# Patient Record
Sex: MALE | Race: BLACK OR AFRICAN AMERICAN | Employment: OTHER | URBAN - METROPOLITAN AREA
[De-identification: names, ages, dates, MRNs, and addresses within clinical notes are randomized per-mention and may not be internally consistent; named-entity substitution may affect disease eponyms.]

---

## 2019-10-15 LAB
CREATININE, EXTERNAL: 1.02
LDL-C, EXTERNAL: 110

## 2020-01-31 LAB — PSA, EXTERNAL: 5.8

## 2020-02-01 LAB — PSA, EXTERNAL: 5.8

## 2020-02-12 ENCOUNTER — OP HISTORICAL/CONVERTED ENCOUNTER (OUTPATIENT)
Dept: OTHER | Age: 65
End: 2020-02-12

## 2020-02-12 LAB
INR, EXTERNAL: 1.1
PT, EXTERNAL: 11

## 2020-03-13 ENCOUNTER — OP HISTORICAL/CONVERTED ENCOUNTER (OUTPATIENT)
Dept: OTHER | Age: 65
End: 2020-03-13

## 2020-06-24 VITALS
TEMPERATURE: 98.2 F | SYSTOLIC BLOOD PRESSURE: 111 MMHG | BODY MASS INDEX: 20.73 KG/M2 | HEIGHT: 69 IN | DIASTOLIC BLOOD PRESSURE: 66 MMHG | WEIGHT: 140 LBS

## 2020-06-24 RX ORDER — LEVOFLOXACIN 500 MG/1
TABLET, FILM COATED ORAL DAILY
COMMUNITY
End: 2020-12-16 | Stop reason: ALTCHOICE

## 2020-06-24 RX ORDER — TRAZODONE HYDROCHLORIDE 50 MG/1
50 TABLET ORAL
COMMUNITY
End: 2021-04-13 | Stop reason: SDUPTHER

## 2020-06-24 RX ORDER — LISINOPRIL 2.5 MG/1
2.5 TABLET ORAL DAILY
COMMUNITY

## 2020-06-24 RX ORDER — NITROGLYCERIN 0.4 MG/1
0.4 TABLET SUBLINGUAL
COMMUNITY
End: 2021-01-07

## 2020-06-24 RX ORDER — SIMVASTATIN 20 MG/1
TABLET, FILM COATED ORAL
COMMUNITY
End: 2020-12-16 | Stop reason: ALTCHOICE

## 2020-06-24 RX ORDER — SILDENAFIL 50 MG/1
100 TABLET, FILM COATED ORAL AS NEEDED
COMMUNITY
End: 2021-01-07

## 2020-06-24 RX ORDER — BISMUTH SUBSALICYLATE 262 MG
1 TABLET,CHEWABLE ORAL DAILY
COMMUNITY

## 2020-07-21 LAB — PSA, EXTERNAL: 6.8

## 2020-07-22 LAB — PSA SERPL-MCNC: 6.8 NG/ML (ref 0–4)

## 2020-07-29 VITALS
WEIGHT: 145 LBS | DIASTOLIC BLOOD PRESSURE: 66 MMHG | TEMPERATURE: 98.2 F | HEIGHT: 69 IN | SYSTOLIC BLOOD PRESSURE: 111 MMHG | BODY MASS INDEX: 21.48 KG/M2

## 2020-07-29 PROBLEM — R39.13 SPLITTING OF URINARY STREAM: Status: ACTIVE | Noted: 2020-07-29

## 2020-07-29 PROBLEM — N32.0 BLADDER NECK OBSTRUCTION: Status: ACTIVE | Noted: 2020-07-29

## 2020-07-29 PROBLEM — R35.0 FREQUENCY OF MICTURITION: Status: ACTIVE | Noted: 2020-07-29

## 2020-07-29 PROBLEM — C61 MALIGNANT NEOPLASM OF PROSTATE (HCC): Status: ACTIVE | Noted: 2020-07-29

## 2020-07-29 PROBLEM — R39.15 URGENCY OF URINATION: Status: ACTIVE | Noted: 2020-07-29

## 2020-07-29 PROBLEM — R82.79 URINE CULTURE POSITIVE: Status: ACTIVE | Noted: 2020-07-29

## 2020-07-29 PROBLEM — R31.29 MICROSCOPIC HEMATURIA: Status: ACTIVE | Noted: 2020-07-29

## 2020-07-30 ENCOUNTER — OFFICE VISIT (OUTPATIENT)
Dept: UROLOGY | Age: 65
End: 2020-07-30
Payer: MEDICARE

## 2020-07-30 VITALS
BODY MASS INDEX: 19.26 KG/M2 | SYSTOLIC BLOOD PRESSURE: 106 MMHG | TEMPERATURE: 97.9 F | HEIGHT: 69 IN | WEIGHT: 130 LBS | DIASTOLIC BLOOD PRESSURE: 62 MMHG

## 2020-07-30 DIAGNOSIS — R31.29 MICROSCOPIC HEMATURIA: ICD-10-CM

## 2020-07-30 DIAGNOSIS — R39.13 SPLITTING OF URINARY STREAM: ICD-10-CM

## 2020-07-30 DIAGNOSIS — R39.15 URGENCY OF URINATION: ICD-10-CM

## 2020-07-30 DIAGNOSIS — C61 MALIGNANT NEOPLASM OF PROSTATE (HCC): Primary | ICD-10-CM

## 2020-07-30 DIAGNOSIS — N32.0 BLADDER NECK OBSTRUCTION: ICD-10-CM

## 2020-07-30 DIAGNOSIS — R35.0 FREQUENCY OF MICTURITION: ICD-10-CM

## 2020-07-30 LAB
BILIRUB UR QL STRIP: NEGATIVE
GLUCOSE UR-MCNC: NEGATIVE MG/DL
KETONES P FAST UR STRIP-MCNC: NEGATIVE MG/DL
PH UR STRIP: 5.5 [PH] (ref 4.6–8)
PROT UR QL STRIP: NEGATIVE
SP GR UR STRIP: 1.02 (ref 1–1.03)
UA UROBILINOGEN AMB POC: NORMAL (ref 0.2–1)
URINALYSIS CLARITY POC: CLEAR
URINALYSIS COLOR POC: YELLOW
URINE BLOOD POC: NEGATIVE
URINE LEUKOCYTES POC: NEGATIVE
URINE NITRITES POC: NEGATIVE

## 2020-07-30 PROCEDURE — 99214 OFFICE O/P EST MOD 30 MIN: CPT | Performed by: UROLOGY

## 2020-07-30 PROCEDURE — G8420 CALC BMI NORM PARAMETERS: HCPCS | Performed by: UROLOGY

## 2020-07-30 PROCEDURE — 81003 URINALYSIS AUTO W/O SCOPE: CPT | Performed by: UROLOGY

## 2020-07-30 PROCEDURE — G8427 DOCREV CUR MEDS BY ELIG CLIN: HCPCS | Performed by: UROLOGY

## 2020-07-30 NOTE — PROGRESS NOTES
HPI ROS PE NOTE          History of Present Illness   Chief complaint carcinoma of the prostate, bladder neck obstruction, follow-up. Hector Anglin is a 76 y.o. male who presents with carcinoma of the prostate and bladder neck obstruction. Onset of symptoms was referral was made in January of this year due to elevated PSA of 6.4, 2019,  family history of prostate cancer and that his brother  of this disease. Patient underwent cystourethroscopy and prostate biopsy after repeat PSA was 5.8 on  with estimation of [place] 6 carcinoma was found. Metastatic evaluation with CT scan and bone scan was negative for spread. Mild symptoms of bladder neck obstruction are present, straining to urinate less than half the time, intermittency half the time and urinary frequency less than 1 time in 5. Patient had virtual visit follow-up 2020 at which time he was voiding satisfactorily. Continues to be followed for persistent microscopic hematuria as well as his prostate cancer. Stable course since that time. Underwent repeat PSA test 2020, = 6.8. Concern about possible higher risk focus of prostate cancer from biopsy missed by random samples.   Past Medical History:   Diagnosis Date    Hypercholesteremia     Hypertension     Malignant neoplasm of prostate ([facility] [address] 2020    Microscopic hematuria 2020      Past Surgical History:   Procedure Laterality Date    HX UROLOGICAL      cystourethroscopy BRP    HX WISDOM TEETH EXTRACTION      when he was little    [place] CYSTOURETHROSCOPY      [place] CYSTOURETHROSCOPY,URETER CATHETER       Family History   Problem Relation Age of Onset    Colon Cancer Brother     Heart Disease Mother     Hypertension Mother     Cancer Father       Social History     Tobacco Use    Smoking status: Current Every Day Smoker     Packs/day: 1.00     Years: 20.00     Pack years: 20.00    Smokeless tobacco: Never Used   Substance Use Topics    Alcohol use: Not Currently       Prior to Admission medications    Medication Sig Start Date End Date Taking? Authorizing Provider   lisinopriL (PRINIVIL, ZESTRIL) 2.5 mg tablet Take  by mouth daily. Yes Provider, Historical   nitroglycerin (NITROSTAT) 0.4 mg SL tablet 0.4 mg by SubLINGual route every five (5) minutes as needed for Chest Pain. Up to 3 doses. Yes Provider, Historical   sildenafil citrate (VIAGRA) 50 mg tablet Take 100 mg by mouth as needed for Erectile Dysfunction. Yes Provider, Historical   simvastatin (ZOCOR) 20 mg tablet Take  by mouth nightly. Yes Provider, Historical   traZODone (DESYREL) 50 mg tablet Take  by mouth nightly. Yes Provider, Historical   multivitamin (ONE A DAY) tablet Take 1 Tab by mouth daily. Yes Provider, Historical   levoFLOXacin (LEVAQUIN) 500 mg tablet Take  by mouth daily. Provider, Historical     Allergies   Allergen Reactions    Aspirin Unknown (comments)        Review of Systems:  Constitutional: negative  Respiratory: negative  Cardiovascular: negative  Gastrointestinal: negative  Genitourinary:positive for frequency and intermittency, straining to urinate  Musculoskeletal:negative  Neurological: negative     Physical Exam             Physical Exam:   General appearance: alert, cooperative, no distress, appears stated age  Head: Normocephalic, without obvious abnormality, atraumatic  Lungs: clear to auscultation bilaterally  Heart: regular rate and rhythm, S1, S2 normal, no murmur, click, rub or gallop  Abdomen: soft, non-tender.  Bowel sounds normal. No masses,  no organomegaly  Male genitalia: normal  Rectal: prostate enlarged 30 gm benign    Extremities: extremities normal, atraumatic, no cyanosis or edema  Neurologic: Grossly normal        Data Review:   Recent Results (from the past 48 hour(s))   AMB POC URINALYSIS DIP STICK AUTO W/O MICRO    Collection Time: 07/30/20 11:02 AM   Result Value Ref Range    Color (UA POC) Yellow     Clarity (UA POC) Clear Glucose (UA POC) Negative Negative    Bilirubin (UA POC) Negative Negative    Ketones (UA POC) Negative Negative    Specific gravity (UA POC) 1.020 1.001 - 1.035    Blood (UA POC) Negative Negative    pH (UA POC) 5.5 4.6 - 8.0    Protein (UA POC) Negative Negative    Urobilinogen (UA POC) 0.2 mg/dL 0.2 - 1    Nitrites (UA POC) Negative Negative    Leukocyte esterase (UA POC) Negative Negative     No results for input(s): 48 in the last 72 hours. Assessment and Plan:   Carcinoma of the prostate, elevated PSA test, significant  increase in PSA from 5.8,   to 6.8 , 2020; also has family history of prostate cancer with a brother who  of this disease; concern over possible higher risk focus of prostate cancer not diagnosed at the time of  Recent biopsy being  present; situation might call for a repeat biopsy, alternatively, MRI study of the prostate might identify a focus of higher risk disease. Earlier microscopic hematuria appears to have cleared at the time of this visit; bladder neck obstruction situation appears to be stable as well, no additional treatment is needed. Continue  vitamin E for possible Peyronie's disease. MRI of the prostate will be scheduled with a follow-up PSA test prior to the next visit in approximately 6 weeks. Bryant Fitzgerald M.D.  2020

## 2020-08-12 ENCOUNTER — OP HISTORICAL/CONVERTED ENCOUNTER (OUTPATIENT)
Dept: OTHER | Age: 65
End: 2020-08-12

## 2020-09-16 LAB — PSA SERPL-MCNC: 8 NG/ML (ref 0–4)

## 2020-09-22 ENCOUNTER — OFFICE VISIT (OUTPATIENT)
Dept: UROLOGY | Age: 65
End: 2020-09-22
Payer: MEDICARE

## 2020-09-22 VITALS
HEIGHT: 69 IN | SYSTOLIC BLOOD PRESSURE: 108 MMHG | DIASTOLIC BLOOD PRESSURE: 67 MMHG | TEMPERATURE: 98.5 F | WEIGHT: 130 LBS | BODY MASS INDEX: 19.26 KG/M2

## 2020-09-22 DIAGNOSIS — R39.13 SPLITTING OF URINARY STREAM: ICD-10-CM

## 2020-09-22 DIAGNOSIS — R31.29 MICROSCOPIC HEMATURIA: ICD-10-CM

## 2020-09-22 DIAGNOSIS — N32.0 BLADDER NECK OBSTRUCTION: ICD-10-CM

## 2020-09-22 DIAGNOSIS — C61 MALIGNANT NEOPLASM OF PROSTATE (HCC): Primary | ICD-10-CM

## 2020-09-22 DIAGNOSIS — R35.0 FREQUENCY OF MICTURITION: ICD-10-CM

## 2020-09-22 DIAGNOSIS — R39.15 URGENCY OF URINATION: ICD-10-CM

## 2020-09-22 PROCEDURE — G8427 DOCREV CUR MEDS BY ELIG CLIN: HCPCS | Performed by: UROLOGY

## 2020-09-22 PROCEDURE — 99214 OFFICE O/P EST MOD 30 MIN: CPT | Performed by: UROLOGY

## 2020-09-22 PROCEDURE — 81003 URINALYSIS AUTO W/O SCOPE: CPT | Performed by: UROLOGY

## 2020-09-22 NOTE — PROGRESS NOTES
HPI ROS PE NOTE          History of Present Illness   Chief complaint: Follow-up carcinoma of the prostate  Quang Orozco is a 76 y.o. male who presents with follow up with increasing PSA earlier [place] 6 carcinoma of the prostate notes January 31, 2020 PSA was 5.8 persistently elevated after an earlier value of 6.4. Patient had metastatic evaluation with CT scan and bone scan negative for spread repeat PSA test 7/29/2020 = 6.8; concern about a possible higher risk stratification focus led to performance of an MRI study. Study was done to be at [address] [place] [address] radiology but ended up being at [facility]. Reading of the MRI essentially negative for risk disease diagnosing prostatitis patient has mild symptoms of bladder neck obstruction ; International prostate symptom score of 2. Weak stream and intermittency on occasion, patient is delighted with his voiding pattern. Repeat PSA 9/15/2020 = 8.0, unfortunate additional increase. Microscopic hematuria earlier a problem appears to have resolved as of this visit . Past Medical History:   Diagnosis Date    Hypercholesteremia     Hypertension     Malignant neoplasm of prostate ([address] 7/29/2020    Microscopic hematuria 7/29/2020      Past Surgical History:   Procedure Laterality Date    HX UROLOGICAL      cystourethroscopy BRP    HX WISDOM TEETH EXTRACTION      when he was little    [place] CYSTOURETHROSCOPY      [place] CYSTOURETHROSCOPY,URETER CATHETER       Family History   Problem Relation Age of Onset    Colon Cancer Brother     Heart Disease Mother     Hypertension Mother     Cancer Father       Social History     Tobacco Use    Smoking status: Current Every Day Smoker     Packs/day: 1.00     Years: 20.00     Pack years: 20.00    Smokeless tobacco: Never Used   Substance Use Topics    Alcohol use: Not Currently       Prior to Admission medications    Medication Sig Start Date End Date Taking?  Authorizing Provider levoFLOXacin (LEVAQUIN) 500 mg tablet Take  by mouth daily. Yes Provider, Historical   lisinopriL (PRINIVIL, ZESTRIL) 2.5 mg tablet Take  by mouth daily. Yes Provider, Historical   nitroglycerin (NITROSTAT) 0.4 mg SL tablet 0.4 mg by SubLINGual route every five (5) minutes as needed for Chest Pain. Up to 3 doses. Yes Provider, Historical   sildenafil citrate (VIAGRA) 50 mg tablet Take 100 mg by mouth as needed for Erectile Dysfunction. Yes Provider, Historical   simvastatin (ZOCOR) 20 mg tablet Take  by mouth nightly. Yes Provider, Historical   traZODone (DESYREL) 50 mg tablet Take  by mouth nightly. Yes Provider, Historical   multivitamin (ONE A DAY) tablet Take 1 Tab by mouth daily. Yes Provider, Historical     Allergies   Allergen Reactions    Aspirin Hives        Review of Systems:  Constitutional: negative  Respiratory: negative  Cardiovascular: negative  Gastrointestinal: positive for constipation  Genitourinary:positive for decreased stream and Intermittency  Musculoskeletal:positive for arthralgias and stiff joints  Neurological: negative     Physical Exam     Physical Exam:   Visit Vitals  /67   Temp 98.5 °F (36.9 °C) (Temporal)   Ht 5' 9\" (1.753 m)   Wt 130 lb (59 kg)   BMI 19.20 kg/m²     General appearance: alert, cooperative, no distress, appears stated age  Head: Normocephalic, without obvious abnormality, atraumatic  Lungs: clear to auscultation bilaterally  Chest wall: no tenderness  Heart: regular rate and rhythm, S1, S2 normal, no murmur, click, rub or gallop  Abdomen: soft, non-tender.  Bowel sounds normal. No masses,  no organomegaly  Male genitalia: normal  Rectal: Prostate 30 gm benign   Extremities: extremities normal, atraumatic, no cyanosis or edema  Skin: Skin color, texture, turgor normal. No rashes or lesions    Data Review:   Recent Results (from the past 48 hour(s))   AMB POC URINALYSIS DIP STICK AUTO W/O MICRO    Collection Time: 09/22/20 10:45 AM   Result Value Ref Range    Color (UA POC) Yellow     Clarity (UA POC) Clear     Glucose (UA POC) Negative Negative    Bilirubin (UA POC) Negative Negative    Ketones (UA POC) Negative Negative    Specific gravity (UA POC) 1.020 1.001 - 1.035    Blood (UA POC) Negative Negative    pH (UA POC) 5.5 4.6 - 8.0    Protein (UA POC) Negative Negative    Urobilinogen (UA POC) 0.2 mg/dL 0.2 - 1    Nitrites (UA POC) Negative Negative    Leukocyte esterase (UA POC) Negative Negative     No results for input(s): 48 in the last 72 hours. Assessment and Plan:   Joby 6 carcinoma of the prostate, increasing PSA tests concerning for increased risk stratification; MRI study performed at [place], will attempt to obtain a review of this study by Dr. Barry Gonzalez of the radiology department at [facility], who is an expert in prostate MRI. Bladder neck obstruction with mild obstructive symptoms, will continue to follow with no active treatment at this time  Micro scopic hematuria previously investigated currently this is not present  Will repeat PSA test in 6 weeks and obtain follow-up visit, may consider repeat prostate biopsy if inconclusive report from Dr. Barry Gonzalez; this situation is a very difficult diagnostic problem, with increasing PSA tests strongly suggestive of higher risk prostate cancer present. Mr. Renetta Mejia has a reminder for a \"due or due soon\" health maintenance. I have asked that he contact his primary care provider for follow-up on this health maintenance. Daysi Richter M.D.  9/22/2020

## 2020-10-22 DIAGNOSIS — C61 MALIGNANT NEOPLASM OF PROSTATE (HCC): ICD-10-CM

## 2020-11-03 ENCOUNTER — OFFICE VISIT (OUTPATIENT)
Dept: UROLOGY | Age: 65
End: 2020-11-03
Payer: MEDICARE

## 2020-11-03 VITALS
DIASTOLIC BLOOD PRESSURE: 64 MMHG | TEMPERATURE: 97.1 F | BODY MASS INDEX: 19.26 KG/M2 | HEIGHT: 69 IN | WEIGHT: 130 LBS | SYSTOLIC BLOOD PRESSURE: 112 MMHG

## 2020-11-03 DIAGNOSIS — R35.0 FREQUENCY OF MICTURITION: ICD-10-CM

## 2020-11-03 DIAGNOSIS — N32.0 BLADDER NECK OBSTRUCTION: ICD-10-CM

## 2020-11-03 DIAGNOSIS — R39.15 URGENCY OF URINATION: ICD-10-CM

## 2020-11-03 DIAGNOSIS — R31.29 MICROSCOPIC HEMATURIA: ICD-10-CM

## 2020-11-03 DIAGNOSIS — C61 MALIGNANT NEOPLASM OF PROSTATE (HCC): Primary | ICD-10-CM

## 2020-11-03 DIAGNOSIS — R39.13 SPLITTING OF URINARY STREAM: ICD-10-CM

## 2020-11-03 LAB
BILIRUB UR QL STRIP: NEGATIVE
GLUCOSE UR-MCNC: NEGATIVE MG/DL
KETONES P FAST UR STRIP-MCNC: NEGATIVE MG/DL
PH UR STRIP: 6 [PH] (ref 4.6–8)
PROT UR QL STRIP: NEGATIVE
SP GR UR STRIP: 1.02 (ref 1–1.03)
UA UROBILINOGEN AMB POC: NORMAL (ref 0.2–1)
URINALYSIS CLARITY POC: CLEAR
URINALYSIS COLOR POC: YELLOW
URINE BLOOD POC: NEGATIVE
URINE LEUKOCYTES POC: NEGATIVE
URINE NITRITES POC: NEGATIVE

## 2020-11-03 PROCEDURE — 81003 URINALYSIS AUTO W/O SCOPE: CPT | Performed by: UROLOGY

## 2020-11-03 PROCEDURE — 99214 OFFICE O/P EST MOD 30 MIN: CPT | Performed by: UROLOGY

## 2020-11-03 PROCEDURE — G8427 DOCREV CUR MEDS BY ELIG CLIN: HCPCS | Performed by: UROLOGY

## 2020-11-03 NOTE — PROGRESS NOTES
HPI ROS PE NOTE          History of Present Illness   Chief complaint: Carcinoma of the prostate follow-up:  Ruiz Wright is a 76 y.o. male who presents with follow-up for increasing PSA test displayed an earlier [place] 6 carcinoma of the prostate diagnosed January 2020 at which time PSA was 5.8. Follow-up PSA in July 2020 was 6.8 concerned about possible increased risk stratification led to performing MRI of the prostate to check for higher grade lesions. I had intended to send the patient to [facility] Department of radiology with reading by Dr. Thiago Silveira, but study was done locally at [facility]. The reading of the MRI was negative for high risk lesions. However, the patient had a repeat PSA in September 2020 which was 8.0 further raising the concern of an undiagnosed lesion with higher risk stratification. The patient has only mild bladder outlet obstructive symptoms, international prostate symptom score of 2 consisting of nocturia twice per night with no other significant symptoms last visit I had intended to attempt to get Dr. Thiago Sivleira to review the locally produced MRI but this apparently had not been done as of this visit. The patient does not take any medicine for bladder outlet obstruction. He is noted to be on levofloxacin reason not clear from the record. .  Past Medical History:   Diagnosis Date    Hypercholesteremia     Hypertension     Malignant neoplasm of prostate ([address] 7/29/2020    Microscopic hematuria 7/29/2020      Past Surgical History:   Procedure Laterality Date    HX UROLOGICAL      cystourethroscopy BRP    HX WISDOM TEETH EXTRACTION      when he was little    [place] CYSTOURETHROSCOPY      [place] CYSTOURETHROSCOPY,URETER CATHETER       Family History   Problem Relation Age of Onset    Colon Cancer Brother     Heart Disease Mother     Hypertension Mother     Cancer Father       Social History     Tobacco Use    Smoking status: Current Every Day Smoker     Packs/day: 1.00     Years: 20.00     Pack years: 20.00    Smokeless tobacco: Never Used   Substance Use Topics    Alcohol use: Not Currently       Prior to Admission medications    Medication Sig Start Date End Date Taking? Authorizing Provider   levoFLOXacin (LEVAQUIN) 500 mg tablet Take  by mouth daily. Yes Provider, Historical   lisinopriL (PRINIVIL, ZESTRIL) 2.5 mg tablet Take  by mouth daily. Yes Provider, Historical   nitroglycerin (NITROSTAT) 0.4 mg SL tablet 0.4 mg by SubLINGual route every five (5) minutes as needed for Chest Pain. Up to 3 doses. Yes Provider, Historical   sildenafil citrate (VIAGRA) 50 mg tablet Take 100 mg by mouth as needed for Erectile Dysfunction. Yes Provider, Historical   simvastatin (ZOCOR) 20 mg tablet Take  by mouth nightly. Yes Provider, Historical   traZODone (DESYREL) 50 mg tablet Take  by mouth nightly. Yes Provider, Historical   multivitamin (ONE A DAY) tablet Take 1 Tab by mouth daily. Yes Provider, Historical     Allergies   Allergen Reactions    Aspirin Other (comments)     \"jittery\"        Review of Systems:  Constitutional: negative  Respiratory: negative  Cardiovascular: negative  Gastrointestinal: positive for constipation  Genitourinary:positive for Intermittency incomplete bladder emptying nocturia  Musculoskeletal:positive for arthralgias and stiff joints  Neurological: negative     Physical Exam     Physical Exam:   Visit Vitals  /64   Temp 97.1 °F (36.2 °C) (Temporal)   Ht 5' 9\" (1.753 m)   Wt 130 lb (59 kg)   BMI 19.20 kg/m²     General appearance: alert, cooperative, no distress, appears stated age  Head: Normocephalic, without obvious abnormality, atraumatic  Lungs: clear to auscultation bilaterally  Heart: regular rate and rhythm, S1, S2 normal, no murmur, click, rub or gallop  Abdomen: soft, non-tender.  Bowel sounds normal. No masses,  no organomegaly  Male genitalia: normal  Rectal: Prostate 30 g benign  Extremities: extremities normal, atraumatic, no cyanosis or edema  Skin: Skin color, texture, turgor normal. No rashes or lesions    Data Review:   Recent Results (from the past 48 hour(s))   AMB POC URINALYSIS DIP STICK AUTO W/O MICRO    Collection Time: 11/03/20 11:45 AM   Result Value Ref Range    Color (UA POC) Yellow     Clarity (UA POC) Clear     Glucose (UA POC) Negative Negative    Bilirubin (UA POC) Negative Negative    Ketones (UA POC) Negative Negative    Specific gravity (UA POC) 1.025 1.001 - 1.035    Blood (UA POC) Negative Negative    pH (UA POC) 6.0 4.6 - 8.0    Protein (UA POC) Negative Negative    Urobilinogen (UA POC) 0.2 mg/dL 0.2 - 1    Nitrites (UA POC) Negative Negative    Leukocyte esterase (UA POC) Negative Negative     No results for input(s): 48 in the last 72 hours. Assessment and Plan:   Carcinoma of the prostate earlier Joby 6 on biopsy, but increasing PSAs progressively raising concern for other occult lesions with higher risk stratification. Will repeat the PSA test today to check for progression in the last 2 months; I telephoned to Dr. Tad Snowden and left a message on his voicemail about asking him to review Mr. Ramone Alberts previous MRI study from [facility]. A follow-up appointment for 1 month is made and if all other matters are inconclusive and PSA is continuing to rise may perform a repeat MRI to be done at [facility] as earlier planned. Bladder neck obstruction, mild to moderate symptoms, currently no medications are prescribed. Extended consultation including telephone call to [facility] discussion of alternatives, 25 minutes of face-to-face counseling physical exam and record review plus telephone consultation. No problem-specific Assessment & Plan notes found for this encounter. Mr. Ramone Alberts has a reminder for a \"due or due soon\" health maintenance. I have asked that he contact his primary care provider for follow-up on this health maintenance. Miguel A Small M.D.  11/3/2020

## 2020-11-04 LAB
PSA FREE MFR SERPL: 34.4 %
PSA FREE SERPL-MCNC: 1.89 NG/ML
PSA SERPL-MCNC: 5.5 NG/ML (ref 0–4)
REFLEX CRITERIA: ABNORMAL

## 2020-11-05 ENCOUNTER — VIRTUAL VISIT (OUTPATIENT)
Dept: UROLOGY | Age: 65
End: 2020-11-05
Payer: MEDICARE

## 2020-11-05 DIAGNOSIS — R31.29 MICROSCOPIC HEMATURIA: ICD-10-CM

## 2020-11-05 DIAGNOSIS — R35.0 FREQUENCY OF MICTURITION: ICD-10-CM

## 2020-11-05 DIAGNOSIS — C61 MALIGNANT NEOPLASM OF PROSTATE (HCC): Primary | ICD-10-CM

## 2020-11-05 DIAGNOSIS — R39.13 SPLITTING OF URINARY STREAM: ICD-10-CM

## 2020-11-05 DIAGNOSIS — R39.15 URGENCY OF URINATION: ICD-10-CM

## 2020-11-05 DIAGNOSIS — N32.0 BLADDER NECK OBSTRUCTION: ICD-10-CM

## 2020-11-05 PROCEDURE — 99443 PR PHYS/QHP TELEPHONE EVALUATION 21-30 MIN: CPT | Performed by: UROLOGY

## 2020-11-05 NOTE — PROGRESS NOTES
HPI ROS PE NOTE          History of Present Illness   Chief complaint: Carcinoma of the prostate, increasing PSA, strong family history of prostate cancer; his visit is an extended telephone consultation with the patient and his family regarding his prostate cancer circumstances. Patient himself is of limited intellectual ability and requires some assistance from family members. Luke Griffin is a 76 y.o. male who presents with carcinoma of the prostate diagnosed in  2020 after PSA of 5.8 prompted prostate biopsy, result was [place] 6 in 1 specimen. This was thought to be low risk as expected, however PSA values have significantly increased months since that time. The patient was seen in the office November 3, 2020; her earlier visit patient was recommended to have an MRI of the prostate which was planned to be referred to [facility] Department of urology, to utilize the expertise of Dr. Román Moffett, Professor at [facility]. Due to a scheduling problem the patient had MRI done at [facility] which was read as normal with no evidence of significant foci of high risk prostate cancer. Since that visit I attempted to call Dr. Román Moffett to enlist his assistance in providing additional review of the MRI taken at the local hospital here. I was unsuccessful at getting in touch with him. The patient's sister concerned because of 2 other brothers who  from prostate cancer and thereby a strong family history of high risk prostate cancer. Patient had a repeat PSA done after his office visit 2 days ago. The value of this study was 5.5. The patient has mild bladder neck obstruction symptoms in addition to his elevated PSA International prostate symptom score of 2 reflecting occasional weak stream and intermittency .    Past Medical History:   Diagnosis Date    Hypercholesteremia     Hypertension     Malignant neoplasm of prostate ([address] 2020    Microscopic hematuria 2020      Past Surgical History: Procedure Laterality Date    HX UROLOGICAL      cystourethroscopy BRP    HX WISDOM TEETH EXTRACTION      when he was little    [place] CYSTOURETHROSCOPY      [place] CYSTOURETHROSCOPY,URETER CATHETER       Family History   Problem Relation Age of Onset    Colon Cancer Brother     Heart Disease Mother     Hypertension Mother     Cancer Father       Social History     Tobacco Use    Smoking status: Current Every Day Smoker     Packs/day: 1.00     Years: 20.00     Pack years: 20.00    Smokeless tobacco: Never Used   Substance Use Topics    Alcohol use: Not Currently       Prior to Admission medications    Medication Sig Start Date End Date Taking? Authorizing Provider   levoFLOXacin (LEVAQUIN) 500 mg tablet Take  by mouth daily. Yes Provider, Historical   lisinopriL (PRINIVIL, ZESTRIL) 2.5 mg tablet Take  by mouth daily. Yes Provider, Historical   nitroglycerin (NITROSTAT) 0.4 mg SL tablet 0.4 mg by SubLINGual route every five (5) minutes as needed for Chest Pain. Up to 3 doses. Yes Provider, Historical   sildenafil citrate (VIAGRA) 50 mg tablet Take 100 mg by mouth as needed for Erectile Dysfunction. Yes Provider, Historical   simvastatin (ZOCOR) 20 mg tablet Take  by mouth nightly. Yes Provider, Historical   traZODone (DESYREL) 50 mg tablet Take  by mouth nightly. Yes Provider, Historical   multivitamin (ONE A DAY) tablet Take 1 Tab by mouth daily. Yes Provider, Historical     Allergies   Allergen Reactions    Aspirin Other (comments)     \"jittery\"        Review of Systems:  Constitutional: negative  Cardiovascular: negative  Gastrointestinal: positive for constipation  Genitourinary:positive for decreased stream and Intermittency, Nocturia     Physical Exam     Physical Exam:   No exam performed today, patient had telephone consultation, virtual visit. Data Review:   No results found for this or any previous visit (from the past 48 hour(s)).   No results for input(s): 48 in the last 72 hours.      Assessment and Plan:   Carcinoma of the prostate concerning for increasing levels of PSA, original [place] 6 diagnosis with low risk with strong family history of 3 brothers dying of prostate cancer, PSA increased to 8.0 last month, extended discussion with the patient and his sister about the circumstances. Despite MRI being negative at a local hospital the patient's family wishes to engage the expertise of the [facility] Dr. Harris Lund with the most extended expertise and prostate MRIs and prostate cancer targeted biopsy diagnosis. An extended 25-minute consultation took place with review of all of the records and discussion of alternatives of care, counseling. We will seek to obtain appointment for prostate MRI from [facility] Department of radiology in the near future to resolve this issue. If MRI is negative I believe the patient can have a 6-month follow-up with PSA test done at that time. No problem-specific Assessment & Plan notes found for this encounter. Mr. Bret Piña has a reminder for a \"due or due soon\" health maintenance. I have asked that he contact his primary care provider for follow-up on this health maintenance. Silvestre Gonzalez M.D.  11/5/2020

## 2020-12-01 ENCOUNTER — TELEPHONE (OUTPATIENT)
Dept: UROLOGY | Age: 65
End: 2020-12-01

## 2020-12-01 NOTE — TELEPHONE ENCOUNTER
pts sister- Rena Rios contacted the office said she thought you were ordering a MRI of the prostate at [facility] and she hasn't heard anything. I do see that in your note but no order. Also she stated that the pt is concerned enough due to family history of prostate cancer that he just wants to go ahead and have his prostate removed. ... can you please call her and address this and what you would like to do next.

## 2020-12-15 PROBLEM — R55 SYNCOPE AND COLLAPSE: Status: ACTIVE | Noted: 2020-12-15

## 2020-12-16 ENCOUNTER — OFFICE VISIT (OUTPATIENT)
Dept: UROLOGY | Age: 65
End: 2020-12-16
Payer: MEDICARE

## 2020-12-16 VITALS
SYSTOLIC BLOOD PRESSURE: 130 MMHG | WEIGHT: 148 LBS | DIASTOLIC BLOOD PRESSURE: 60 MMHG | HEIGHT: 69 IN | TEMPERATURE: 97.8 F | BODY MASS INDEX: 21.92 KG/M2

## 2020-12-16 DIAGNOSIS — R39.15 URGENCY OF URINATION: ICD-10-CM

## 2020-12-16 DIAGNOSIS — C61 MALIGNANT NEOPLASM OF PROSTATE (HCC): Primary | ICD-10-CM

## 2020-12-16 DIAGNOSIS — R31.29 MICROSCOPIC HEMATURIA: ICD-10-CM

## 2020-12-16 DIAGNOSIS — R35.0 FREQUENCY OF MICTURITION: ICD-10-CM

## 2020-12-16 PROBLEM — N32.0 BLADDER NECK OBSTRUCTION: Status: RESOLVED | Noted: 2020-07-29 | Resolved: 2020-12-16

## 2020-12-16 PROBLEM — R39.13 SPLITTING OF URINARY STREAM: Status: RESOLVED | Noted: 2020-07-29 | Resolved: 2020-12-16

## 2020-12-16 LAB
BILIRUB UR QL STRIP: NEGATIVE
GLUCOSE UR-MCNC: NEGATIVE MG/DL
KETONES P FAST UR STRIP-MCNC: NEGATIVE MG/DL
PH UR STRIP: 5 [PH] (ref 4.6–8)
PROT UR QL STRIP: NEGATIVE
SP GR UR STRIP: 1.02 (ref 1–1.03)
UA UROBILINOGEN AMB POC: NORMAL (ref 0.2–1)
URINALYSIS CLARITY POC: CLEAR
URINALYSIS COLOR POC: YELLOW
URINE BLOOD POC: NEGATIVE
URINE LEUKOCYTES POC: NEGATIVE
URINE NITRITES POC: NEGATIVE

## 2020-12-16 PROCEDURE — 81003 URINALYSIS AUTO W/O SCOPE: CPT | Performed by: UROLOGY

## 2020-12-16 PROCEDURE — 99215 OFFICE O/P EST HI 40 MIN: CPT | Performed by: UROLOGY

## 2020-12-16 NOTE — ASSESSMENT & PLAN NOTE
Joby 6 prostate cancer diagnosed 2/12/2020. Has been on active surveillance. PSA was 6.4 October 2019. PSA 11/3/2020 was 5.5 with 34% free. His PSA was 8.0 on 9/15/2020. Options include active surveillance, surgery, radiation, androgen deprivation, or other uncommon treatments. We specifically discussed robotic prostatectomy with pelvic lymph node dissection. The risks include standard risks of anesthesia and surgery including bleeding, clots, injury, infection, death or other. The specific risks include incontinence, impotence, swelling or edema. The patient had the opportunity to ask questions.   He wishes to proceed to a robotic prostatectomy with pelvic lymph node dissection

## 2020-12-16 NOTE — PROGRESS NOTES
HISTORY OF PRESENT ILLNESS  Tae Sherwood is a 76 y.o. male. Chief Complaint   Patient presents with    Follow-up    Prostate Cancer    Urinary Frequency    Microscopic Hematuria     He is a 59-year-old man diagnosed with prostate cancer by Dr. Fatou Robertson in 2020. He had Placentia 6 cancer and is on active surveillance. His PSA has been fluctuating. See the problem list.    He is interested in surgery. He is here with his brother-in-law. His sister Rena Rios is on the phone as well. Occasionally he has a slow stream but is not bothered. Chronic Conditions Addressed Today     1. Malignant neoplasm of prostate [facility]) - Primary     Overview      He had a prostate biopsy by Dr. Xiang Craven on 2/12/2020. PSA October 2019 was 6.4. There was a single focus of Placentia's grade 3+3 at the left apex. CT on 3/13/2020 revealed splenomegaly and signs of portal venous hypertension. There is no evidence of metastatic disease. Bone scan 3/13/2020 revealed no metastatic disease. The patient had an MRI of the pelvis on 8/21/2020. There were no concerning findings. Current Assessment & Plan      Joby 6 prostate cancer diagnosed 2/12/2020. Has been on active surveillance. PSA was 6.4 October 2019. PSA 11/3/2020 was 5.5 with 34% free. His PSA was 8.0 on 9/15/2020. Options include active surveillance, surgery, radiation, androgen deprivation, or other uncommon treatments. We specifically discussed robotic prostatectomy with pelvic lymph node dissection. The risks include standard risks of anesthesia and surgery including bleeding, clots, injury, infection, death or other. The specific risks include incontinence, impotence, swelling or edema. The patient had the opportunity to ask questions. He wishes to proceed to a robotic prostatectomy with pelvic lymph node dissection         2. Microscopic hematuria     Current Assessment & Plan      He has no gross hematuria.          3. Frequency of micturition     Current Assessment & Plan      He is not bothered by his urinary pattern         4. RESOLVED: Urgency of urination            Review of Systems   All other systems reviewed and are negative. Past Medical History:   Diagnosis Date    Hypercholesteremia     Hypertension     Malignant neoplasm of prostate ([address] 7/29/2020    Microscopic hematuria 7/29/2020      Past Surgical History:   Procedure Laterality Date    HX UROLOGICAL      cystourethroscopy BRP    HX WISDOM TEETH EXTRACTION      when he was little    OH CYSTOURETHROSCOPY      OH CYSTOURETHROSCOPY,URETER CATHETER       Family History   Problem Relation Age of Onset    Colon Cancer Brother     Cancer Brother     Heart Disease Mother     Hypertension Mother     Cancer Father     Cancer Paternal Grandfather         Physical Exam  Constitutional:       General: He is not in acute distress. Appearance: Normal appearance. HENT:      Head: Normocephalic and atraumatic. Eyes:      Extraocular Movements: Extraocular movements intact. Pupils: Pupils are equal, round, and reactive to light. Cardiovascular:      Rate and Rhythm: Normal rate and regular rhythm. Pulmonary:      Effort: Pulmonary effort is normal. No respiratory distress. Breath sounds: Normal breath sounds. No wheezing or rhonchi. Abdominal:      General: Abdomen is flat. There is no distension. Palpations: Abdomen is soft. There is no mass. Tenderness: There is no abdominal tenderness. There is no right CVA tenderness, left CVA tenderness, guarding or rebound. Hernia: No hernia is present. Genitourinary:     Penis: Normal. No phimosis, hypospadias or tenderness. Testes: Normal.         Right: Mass, tenderness or swelling not present. Left: Mass, tenderness or swelling not present. Epididymis:      Right: Normal. No mass or tenderness. Left: Normal. No mass or tenderness. Prostate: Enlarged (2+.).  Not tender and no nodules present. Rectum: Normal.   Musculoskeletal: Normal range of motion. Lymphadenopathy:      Cervical: No cervical adenopathy. Upper Body:      Right upper body: No supraclavicular adenopathy. Left upper body: No supraclavicular adenopathy. Skin:     General: Skin is warm and dry. Neurological:      General: No focal deficit present. Mental Status: He is alert and oriented to person, place, and time. Psychiatric:         Mood and Affect: Mood normal.         Behavior: Behavior normal.                     ASSESSMENT and PLAN  Diagnoses and all orders for this visit:    1. Malignant neoplasm of prostate [facility])  Assessment & Plan:  Joby 6 prostate cancer diagnosed 2/12/2020. Has been on active surveillance. PSA was 6.4 October 2019. PSA 11/3/2020 was 5.5 with 34% free. His PSA was 8.0 on 9/15/2020. Options include active surveillance, surgery, radiation, androgen deprivation, or other uncommon treatments. We specifically discussed robotic prostatectomy with pelvic lymph node dissection. The risks include standard risks of anesthesia and surgery including bleeding, clots, injury, infection, death or other. The specific risks include incontinence, impotence, swelling or edema. The patient had the opportunity to ask questions. He wishes to proceed to a robotic prostatectomy with pelvic lymph node dissection      2. Urgency of urination  -     AMB POC URINALYSIS DIP STICK AUTO W/O MICRO    3. Frequency of micturition  Assessment & Plan:  He is not bothered by his urinary pattern      4. Microscopic hematuria  Assessment & Plan:  He has no gross hematuria. I spent over 40 minutes with the patient and records. Greater than 50% of the time was in counseling. The patient and family had the opportunity ask questions and felt comfortable proceeding as planned.         Marcia Mayorga MD

## 2020-12-16 NOTE — LETTER
12/16/2020 Patient: Margarita Arnold YOB: 1955 Date of Visit: 12/16/2020 Gatito Sher DO 
[address] [place] [id number] Via Fax: [phone] Dear Gatito Sher DO, Thank you for referring Mr. Shahida Bright to [place] [address] for evaluation. My notes for this consultation are attached. If you have questions, please do not hesitate to call me. I look forward to following your patient along with you.  
 
 
Sincerely, 
 
Marcella Kong MD

## 2021-01-07 ENCOUNTER — HOSPITAL ENCOUNTER (OUTPATIENT)
Dept: PREADMISSION TESTING | Age: 66
Discharge: HOME | End: 2021-01-07
Payer: MEDICARE

## 2021-01-07 ENCOUNTER — HOSPITAL ENCOUNTER (OUTPATIENT)
Dept: GENERAL RADIOLOGY | Age: 66
Discharge: HOME | End: 2021-01-07
Attending: UROLOGY
Payer: MEDICARE

## 2021-01-07 VITALS
HEIGHT: 70 IN | HEART RATE: 54 BPM | DIASTOLIC BLOOD PRESSURE: 70 MMHG | WEIGHT: 147 LBS | SYSTOLIC BLOOD PRESSURE: 131 MMHG | OXYGEN SATURATION: 98 % | RESPIRATION RATE: 16 BRPM | TEMPERATURE: 97.7 F | BODY MASS INDEX: 21.05 KG/M2

## 2021-01-07 LAB
ABO + RH BLD: NORMAL
ANION GAP SERPL CALC-SCNC: 3 MMOL/L (ref 5–15)
APPEARANCE UR: CLEAR
ATRIAL RATE: 50 BPM
BACTERIA URNS QL MICRO: NEGATIVE /HPF
BILIRUB UR QL: NEGATIVE
BLOOD GROUP ANTIBODIES SERPL: NEGATIVE
BUN SERPL-MCNC: 19 MG/DL (ref 6–20)
BUN/CREAT SERPL: 17 (ref 12–20)
CA-I BLD-MCNC: 9.2 MG/DL (ref 8.5–10.1)
CALCULATED P AXIS, ECG09: 74 DEGREES
CALCULATED R AXIS, ECG10: 68 DEGREES
CALCULATED T AXIS, ECG11: 74 DEGREES
CHLORIDE SERPL-SCNC: 111 MMOL/L (ref 97–108)
CO2 SERPL-SCNC: 29 MMOL/L (ref 21–32)
COLOR UR: ABNORMAL
CREAT SERPL-MCNC: 1.13 MG/DL (ref 0.7–1.3)
DIAGNOSIS, 93000: NORMAL
ERYTHROCYTE [DISTWIDTH] IN BLOOD BY AUTOMATED COUNT: 15.2 % (ref 11.5–14.5)
GLUCOSE SERPL-MCNC: 95 MG/DL (ref 65–100)
GLUCOSE UR STRIP.AUTO-MCNC: NEGATIVE MG/DL
HCT VFR BLD AUTO: 36.2 % (ref 36.6–50.3)
HGB BLD-MCNC: 12.5 G/DL (ref 12.1–17)
HGB UR QL STRIP: NEGATIVE
KETONES UR QL STRIP.AUTO: NEGATIVE MG/DL
LEUKOCYTE ESTERASE UR QL STRIP.AUTO: NEGATIVE
MCH RBC QN AUTO: 27.4 PG (ref 26–34)
MCHC RBC AUTO-ENTMCNC: 34.5 G/DL (ref 30–36.5)
MCV RBC AUTO: 79.4 FL (ref 80–99)
MUCOUS THREADS URNS QL MICRO: ABNORMAL /LPF
NITRITE UR QL STRIP.AUTO: NEGATIVE
P-R INTERVAL, ECG05: 174 MS
PH UR STRIP: 5 [PH] (ref 5–8)
PLATELET # BLD AUTO: 87 K/UL (ref 150–400)
PMV BLD AUTO: 8.2 FL (ref 8.9–12.9)
POTASSIUM SERPL-SCNC: 4.2 MMOL/L (ref 3.5–5.1)
PROT UR STRIP-MCNC: NEGATIVE MG/DL
Q-T INTERVAL, ECG07: 466 MS
QRS DURATION, ECG06: 78 MS
QTC CALCULATION (BEZET), ECG08: 424 MS
RBC # BLD AUTO: 4.56 M/UL (ref 4.1–5.7)
RBC #/AREA URNS HPF: ABNORMAL /HPF (ref 0–5)
SARS-COV-2, COV2: NORMAL
SODIUM SERPL-SCNC: 143 MMOL/L (ref 136–145)
SP GR UR REFRACTOMETRY: 1.02 (ref 1–1.03)
SPECIMEN EXP DATE BLD: NORMAL
UROBILINOGEN UR QL STRIP.AUTO: 2 EU/DL (ref 0.1–1)
VENTRICULAR RATE, ECG03: 50 BPM
WBC # BLD AUTO: 5.9 K/UL (ref 4.1–11.1)
WBC URNS QL MICRO: ABNORMAL /HPF (ref 0–4)

## 2021-01-07 PROCEDURE — 81003 URINALYSIS AUTO W/O SCOPE: CPT

## 2021-01-07 PROCEDURE — 80048 BASIC METABOLIC PNL TOTAL CA: CPT

## 2021-01-07 PROCEDURE — 93005 ELECTROCARDIOGRAM TRACING: CPT

## 2021-01-07 PROCEDURE — 71046 X-RAY EXAM CHEST 2 VIEWS: CPT

## 2021-01-07 PROCEDURE — 85027 COMPLETE CBC AUTOMATED: CPT

## 2021-01-07 PROCEDURE — 87086 URINE CULTURE/COLONY COUNT: CPT

## 2021-01-07 PROCEDURE — 87635 SARS-COV-2 COVID-19 AMP PRB: CPT

## 2021-01-07 PROCEDURE — 86901 BLOOD TYPING SEROLOGIC RH(D): CPT

## 2021-01-07 PROCEDURE — 36415 COLL VENOUS BLD VENIPUNCTURE: CPT

## 2021-01-07 RX ORDER — ERGOCALCIFEROL 1.25 MG/1
50000 CAPSULE ORAL
COMMUNITY

## 2021-01-07 RX ORDER — SIMVASTATIN 40 MG/1
40 TABLET, FILM COATED ORAL
COMMUNITY

## 2021-01-07 NOTE — PROGRESS NOTES
Carolina Milligan NP is aware of platelet count of 87 and urobilinogen of 2. No new orders at this time.

## 2021-01-08 LAB — SARS-COV-2, COV2NT: NOT DETECTED

## 2021-01-09 LAB
BACTERIA SPEC CULT: NORMAL
SPECIAL REQUESTS,SREQ: NORMAL

## 2021-01-11 ENCOUNTER — ANESTHESIA EVENT (OUTPATIENT)
Dept: SURGERY | Age: 66
End: 2021-01-11
Payer: MEDICARE

## 2021-01-11 ENCOUNTER — ANESTHESIA (OUTPATIENT)
Dept: SURGERY | Age: 66
End: 2021-01-11
Payer: MEDICARE

## 2021-01-11 ENCOUNTER — HOSPITAL ENCOUNTER (OUTPATIENT)
Age: 66
Discharge: HOME | End: 2021-01-12
Attending: UROLOGY | Admitting: UROLOGY
Payer: MEDICARE

## 2021-01-11 DIAGNOSIS — C61 PROSTATE CANCER (HCC): Primary | ICD-10-CM

## 2021-01-11 LAB
APPEARANCE UR: CLEAR
BACTERIA URNS QL MICRO: NEGATIVE /HPF
BILIRUB UR QL: NEGATIVE
COLOR UR: ABNORMAL
GLUCOSE UR STRIP.AUTO-MCNC: NEGATIVE MG/DL
HGB UR QL STRIP: NEGATIVE
KETONES UR QL STRIP.AUTO: NEGATIVE MG/DL
LEUKOCYTE ESTERASE UR QL STRIP.AUTO: NEGATIVE
MRSA DNA SPEC QL NAA+PROBE: NOT DETECTED
MUCOUS THREADS URNS QL MICRO: ABNORMAL /LPF
NITRITE UR QL STRIP.AUTO: NEGATIVE
PH UR STRIP: 5 [PH] (ref 5–8)
PROT UR STRIP-MCNC: NEGATIVE MG/DL
RBC #/AREA URNS HPF: ABNORMAL /HPF (ref 0–5)
SP GR UR REFRACTOMETRY: 1.02 (ref 1–1.03)
UROBILINOGEN UR QL STRIP.AUTO: 2 EU/DL (ref 0.1–1)
WBC URNS QL MICRO: ABNORMAL /HPF (ref 0–4)

## 2021-01-11 PROCEDURE — 77030035277 HC OBTRTR BLDELSS DISP INTU -B: Performed by: UROLOGY

## 2021-01-11 PROCEDURE — 74011000250 HC RX REV CODE- 250: Performed by: NURSE ANESTHETIST, CERTIFIED REGISTERED

## 2021-01-11 PROCEDURE — 77030003578 HC NDL INSUF VERES AMR -B: Performed by: UROLOGY

## 2021-01-11 PROCEDURE — 77030018813 HC SCIS LAPSCP EPIX DISP AMR -B: Performed by: UROLOGY

## 2021-01-11 PROCEDURE — 77030002896 HC SUT CLP ABSRB J&J -B: Performed by: UROLOGY

## 2021-01-11 PROCEDURE — 77030019908 HC STETH ESOPH SIMS -A: Performed by: ANESTHESIOLOGY

## 2021-01-11 PROCEDURE — 88309 TISSUE EXAM BY PATHOLOGIST: CPT

## 2021-01-11 PROCEDURE — 77030041703 HC SLV COMPR DVT ARJO -B: Performed by: UROLOGY

## 2021-01-11 PROCEDURE — 74011250637 HC RX REV CODE- 250/637: Performed by: NURSE PRACTITIONER

## 2021-01-11 PROCEDURE — 77030034696 HC CATH URETH FOL 2W BARD -A: Performed by: UROLOGY

## 2021-01-11 PROCEDURE — 74011250636 HC RX REV CODE- 250/636: Performed by: NURSE ANESTHETIST, CERTIFIED REGISTERED

## 2021-01-11 PROCEDURE — 76010000877 HC OR TIME 2.5 TO 3HR INTENSV - TIER 2: Performed by: UROLOGY

## 2021-01-11 PROCEDURE — 77030013079 HC BLNKT BAIR HGGR 3M -A: Performed by: ANESTHESIOLOGY

## 2021-01-11 PROCEDURE — 88344 IMHCHEM/IMCYTCHM EA MLT ANTB: CPT

## 2021-01-11 PROCEDURE — 77030002933 HC SUT MCRYL J&J -A: Performed by: UROLOGY

## 2021-01-11 PROCEDURE — 77030016151 HC PROTCTR LNS DFOG COVD -B: Performed by: UROLOGY

## 2021-01-11 PROCEDURE — 77030005515 HC CATH URETH FOL14 BARD -B: Performed by: UROLOGY

## 2021-01-11 PROCEDURE — 88305 TISSUE EXAM BY PATHOLOGIST: CPT

## 2021-01-11 PROCEDURE — 77030009851 HC PCH RTVR ENDOSC AMR -B: Performed by: UROLOGY

## 2021-01-11 PROCEDURE — 77030008518 HC TBNG INSUF ENDO STRY -B: Performed by: UROLOGY

## 2021-01-11 PROCEDURE — 77030002966 HC SUT PDS J&J -A: Performed by: UROLOGY

## 2021-01-11 PROCEDURE — 77030008606 HC TRCR ENDOSC KII AMR -B: Performed by: UROLOGY

## 2021-01-11 PROCEDURE — 76060000036 HC ANESTHESIA 2.5 TO 3 HR: Performed by: UROLOGY

## 2021-01-11 PROCEDURE — 81001 URINALYSIS AUTO W/SCOPE: CPT

## 2021-01-11 PROCEDURE — 77030008684 HC TU ET CUF COVD -B: Performed by: ANESTHESIOLOGY

## 2021-01-11 PROCEDURE — 74011250636 HC RX REV CODE- 250/636: Performed by: UROLOGY

## 2021-01-11 PROCEDURE — 74011000258 HC RX REV CODE- 258: Performed by: UROLOGY

## 2021-01-11 PROCEDURE — 76210000016 HC OR PH I REC 1 TO 1.5 HR: Performed by: UROLOGY

## 2021-01-11 PROCEDURE — 55866 LAPS SURG PRST8ECT RPBIC RAD: CPT | Performed by: UROLOGY

## 2021-01-11 PROCEDURE — 2709999900 HC NON-CHARGEABLE SUPPLY: Performed by: UROLOGY

## 2021-01-11 PROCEDURE — 77030010935 HC CLP LIG ABSRB TELE -B: Performed by: UROLOGY

## 2021-01-11 PROCEDURE — 77030008756 HC TU IRR SUC STRY -B: Performed by: UROLOGY

## 2021-01-11 PROCEDURE — 74011000250 HC RX REV CODE- 250: Performed by: UROLOGY

## 2021-01-11 PROCEDURE — 38770 REMOVE PELVIS LYMPH NODES: CPT | Performed by: UROLOGY

## 2021-01-11 PROCEDURE — 77030008771 HC TU NG SALEM SUMP -A: Performed by: ANESTHESIOLOGY

## 2021-01-11 PROCEDURE — 74011250637 HC RX REV CODE- 250/637: Performed by: UROLOGY

## 2021-01-11 PROCEDURE — 87641 MR-STAPH DNA AMP PROBE: CPT

## 2021-01-11 PROCEDURE — 77030026438 HC STYL ET INTUB CARD -A: Performed by: ANESTHESIOLOGY

## 2021-01-11 PROCEDURE — 88307 TISSUE EXAM BY PATHOLOGIST: CPT

## 2021-01-11 PROCEDURE — 77030031139 HC SUT VCRL2 J&J -A: Performed by: UROLOGY

## 2021-01-11 PROCEDURE — 77030022704 HC SUT VLOC COVD -B: Performed by: UROLOGY

## 2021-01-11 PROCEDURE — 77030010507 HC ADH SKN DERMBND J&J -B: Performed by: UROLOGY

## 2021-01-11 PROCEDURE — 77030020703 HC SEAL CANN DISP INTU -B: Performed by: UROLOGY

## 2021-01-11 PROCEDURE — 87086 URINE CULTURE/COLONY COUNT: CPT

## 2021-01-11 PROCEDURE — 77030002986 HC SUT PROL J&J -A: Performed by: UROLOGY

## 2021-01-11 DEVICE — CLIP LIG ABSRB HEM-LOK LG PUR --: Type: IMPLANTABLE DEVICE | Status: FUNCTIONAL

## 2021-01-11 RX ORDER — ATROPA BELLADONNA AND OPIUM 16.2; 6 MG/1; MG/1
1 SUPPOSITORY RECTAL
Status: DISCONTINUED | OUTPATIENT
Start: 2021-01-11 | End: 2021-01-11 | Stop reason: SDUPTHER

## 2021-01-11 RX ORDER — FACIAL-BODY WIPES
10 EACH TOPICAL DAILY
Status: DISCONTINUED | OUTPATIENT
Start: 2021-01-12 | End: 2021-01-12 | Stop reason: HOSPADM

## 2021-01-11 RX ORDER — SODIUM CHLORIDE, SODIUM LACTATE, POTASSIUM CHLORIDE, CALCIUM CHLORIDE 600; 310; 30; 20 MG/100ML; MG/100ML; MG/100ML; MG/100ML
INJECTION, SOLUTION INTRAVENOUS
Status: DISCONTINUED | OUTPATIENT
Start: 2021-01-11 | End: 2021-01-11 | Stop reason: HOSPADM

## 2021-01-11 RX ORDER — SODIUM CHLORIDE, SODIUM LACTATE, POTASSIUM CHLORIDE, CALCIUM CHLORIDE 600; 310; 30; 20 MG/100ML; MG/100ML; MG/100ML; MG/100ML
25 INJECTION, SOLUTION INTRAVENOUS CONTINUOUS
Status: DISCONTINUED | OUTPATIENT
Start: 2021-01-11 | End: 2021-01-11 | Stop reason: HOSPADM

## 2021-01-11 RX ORDER — DOCUSATE SODIUM 100 MG/1
100 CAPSULE, LIQUID FILLED ORAL 2 TIMES DAILY
Status: DISPENSED | OUTPATIENT
Start: 2021-01-11 | End: 2021-01-12

## 2021-01-11 RX ORDER — HYDROMORPHONE HYDROCHLORIDE 2 MG/ML
INJECTION, SOLUTION INTRAMUSCULAR; INTRAVENOUS; SUBCUTANEOUS AS NEEDED
Status: DISCONTINUED | OUTPATIENT
Start: 2021-01-11 | End: 2021-01-11 | Stop reason: HOSPADM

## 2021-01-11 RX ORDER — SODIUM CHLORIDE 9 MG/ML
INJECTION, SOLUTION INTRAVENOUS
Status: DISCONTINUED | OUTPATIENT
Start: 2021-01-11 | End: 2021-01-11 | Stop reason: HOSPADM

## 2021-01-11 RX ORDER — ZOLPIDEM TARTRATE 5 MG/1
5 TABLET ORAL
Status: DISCONTINUED | OUTPATIENT
Start: 2021-01-11 | End: 2021-01-12 | Stop reason: HOSPADM

## 2021-01-11 RX ORDER — ONDANSETRON 2 MG/ML
INJECTION INTRAMUSCULAR; INTRAVENOUS AS NEEDED
Status: DISCONTINUED | OUTPATIENT
Start: 2021-01-11 | End: 2021-01-11 | Stop reason: HOSPADM

## 2021-01-11 RX ORDER — FAMOTIDINE 10 MG/ML
INJECTION INTRAVENOUS AS NEEDED
Status: DISCONTINUED | OUTPATIENT
Start: 2021-01-11 | End: 2021-01-11 | Stop reason: HOSPADM

## 2021-01-11 RX ORDER — MORPHINE SULFATE 2 MG/ML
2 INJECTION, SOLUTION INTRAMUSCULAR; INTRAVENOUS
Status: DISCONTINUED | OUTPATIENT
Start: 2021-01-11 | End: 2021-01-12 | Stop reason: HOSPADM

## 2021-01-11 RX ORDER — ROCURONIUM BROMIDE 10 MG/ML
INJECTION, SOLUTION INTRAVENOUS AS NEEDED
Status: DISCONTINUED | OUTPATIENT
Start: 2021-01-11 | End: 2021-01-11 | Stop reason: HOSPADM

## 2021-01-11 RX ORDER — EPHEDRINE SULFATE/0.9% NACL/PF 50 MG/5 ML
SYRINGE (ML) INTRAVENOUS AS NEEDED
Status: DISCONTINUED | OUTPATIENT
Start: 2021-01-11 | End: 2021-01-11 | Stop reason: HOSPADM

## 2021-01-11 RX ORDER — ATROPA BELLADONNA AND OPIUM 16.2; 6 MG/1; MG/1
1 SUPPOSITORY RECTAL
Status: DISCONTINUED | OUTPATIENT
Start: 2021-01-11 | End: 2021-01-12 | Stop reason: HOSPADM

## 2021-01-11 RX ORDER — ACETAMINOPHEN 325 MG/1
650 TABLET ORAL
Status: DISCONTINUED | OUTPATIENT
Start: 2021-01-11 | End: 2021-01-12 | Stop reason: HOSPADM

## 2021-01-11 RX ORDER — DEXTROSE, SODIUM CHLORIDE, AND POTASSIUM CHLORIDE 5; .45; .15 G/100ML; G/100ML; G/100ML
100 INJECTION INTRAVENOUS CONTINUOUS
Status: DISCONTINUED | OUTPATIENT
Start: 2021-01-11 | End: 2021-01-12 | Stop reason: HOSPADM

## 2021-01-11 RX ORDER — NORETHINDRONE AND ETHINYL ESTRADIOL 0.5-0.035
KIT ORAL
Status: DISPENSED
Start: 2021-01-11 | End: 2021-01-11

## 2021-01-11 RX ORDER — SODIUM CHLORIDE 0.9 % (FLUSH) 0.9 %
5-40 SYRINGE (ML) INJECTION AS NEEDED
Status: DISCONTINUED | OUTPATIENT
Start: 2021-01-11 | End: 2021-01-12 | Stop reason: HOSPADM

## 2021-01-11 RX ORDER — FENTANYL CITRATE 50 UG/ML
INJECTION, SOLUTION INTRAMUSCULAR; INTRAVENOUS AS NEEDED
Status: DISCONTINUED | OUTPATIENT
Start: 2021-01-11 | End: 2021-01-11 | Stop reason: HOSPADM

## 2021-01-11 RX ORDER — LIDOCAINE HYDROCHLORIDE 20 MG/ML
INJECTION, SOLUTION EPIDURAL; INFILTRATION; INTRACAUDAL; PERINEURAL AS NEEDED
Status: DISCONTINUED | OUTPATIENT
Start: 2021-01-11 | End: 2021-01-11 | Stop reason: HOSPADM

## 2021-01-11 RX ORDER — NEOSTIGMINE METHYLSULFATE 1 MG/ML
INJECTION INTRAVENOUS AS NEEDED
Status: DISCONTINUED | OUTPATIENT
Start: 2021-01-11 | End: 2021-01-11 | Stop reason: HOSPADM

## 2021-01-11 RX ORDER — DEXAMETHASONE SODIUM PHOSPHATE 4 MG/ML
INJECTION, SOLUTION INTRA-ARTICULAR; INTRALESIONAL; INTRAMUSCULAR; INTRAVENOUS; SOFT TISSUE AS NEEDED
Status: DISCONTINUED | OUTPATIENT
Start: 2021-01-11 | End: 2021-01-11 | Stop reason: HOSPADM

## 2021-01-11 RX ORDER — HYDROCODONE BITARTRATE AND ACETAMINOPHEN 5; 325 MG/1; MG/1
1 TABLET ORAL
Status: DISCONTINUED | OUTPATIENT
Start: 2021-01-11 | End: 2021-01-12 | Stop reason: HOSPADM

## 2021-01-11 RX ORDER — TRAZODONE HYDROCHLORIDE 50 MG/1
50 TABLET ORAL
Status: DISCONTINUED | OUTPATIENT
Start: 2021-01-11 | End: 2021-01-12 | Stop reason: HOSPADM

## 2021-01-11 RX ORDER — LISINOPRIL 5 MG/1
2.5 TABLET ORAL DAILY
Status: DISCONTINUED | OUTPATIENT
Start: 2021-01-12 | End: 2021-01-12 | Stop reason: HOSPADM

## 2021-01-11 RX ORDER — BUPIVACAINE HYDROCHLORIDE 2.5 MG/ML
INJECTION, SOLUTION EPIDURAL; INFILTRATION; INTRACAUDAL AS NEEDED
Status: DISCONTINUED | OUTPATIENT
Start: 2021-01-11 | End: 2021-01-11 | Stop reason: HOSPADM

## 2021-01-11 RX ORDER — MIDAZOLAM HYDROCHLORIDE 1 MG/ML
INJECTION, SOLUTION INTRAMUSCULAR; INTRAVENOUS AS NEEDED
Status: DISCONTINUED | OUTPATIENT
Start: 2021-01-11 | End: 2021-01-11 | Stop reason: HOSPADM

## 2021-01-11 RX ORDER — PROPOFOL 10 MG/ML
INJECTION, EMULSION INTRAVENOUS AS NEEDED
Status: DISCONTINUED | OUTPATIENT
Start: 2021-01-11 | End: 2021-01-11 | Stop reason: HOSPADM

## 2021-01-11 RX ORDER — SIMETHICONE 80 MG
80 TABLET,CHEWABLE ORAL
Status: DISCONTINUED | OUTPATIENT
Start: 2021-01-11 | End: 2021-01-12 | Stop reason: HOSPADM

## 2021-01-11 RX ORDER — ATORVASTATIN CALCIUM 20 MG/1
20 TABLET, FILM COATED ORAL
Status: DISCONTINUED | OUTPATIENT
Start: 2021-01-11 | End: 2021-01-12 | Stop reason: HOSPADM

## 2021-01-11 RX ORDER — ONDANSETRON 2 MG/ML
4 INJECTION INTRAMUSCULAR; INTRAVENOUS
Status: DISCONTINUED | OUTPATIENT
Start: 2021-01-11 | End: 2021-01-12 | Stop reason: HOSPADM

## 2021-01-11 RX ORDER — SODIUM CHLORIDE 0.9 % (FLUSH) 0.9 %
5-40 SYRINGE (ML) INJECTION EVERY 8 HOURS
Status: DISCONTINUED | OUTPATIENT
Start: 2021-01-11 | End: 2021-01-12 | Stop reason: HOSPADM

## 2021-01-11 RX ORDER — DIPHENHYDRAMINE HYDROCHLORIDE 50 MG/ML
12.5 INJECTION, SOLUTION INTRAMUSCULAR; INTRAVENOUS
Status: DISCONTINUED | OUTPATIENT
Start: 2021-01-11 | End: 2021-01-12 | Stop reason: HOSPADM

## 2021-01-11 RX ORDER — GLYCOPYRROLATE 0.2 MG/ML
INJECTION INTRAMUSCULAR; INTRAVENOUS AS NEEDED
Status: DISCONTINUED | OUTPATIENT
Start: 2021-01-11 | End: 2021-01-11 | Stop reason: HOSPADM

## 2021-01-11 RX ORDER — GENTAMICIN SULFATE 40 MG/ML
80 INJECTION, SOLUTION INTRAMUSCULAR; INTRAVENOUS ONCE
Status: DISCONTINUED | OUTPATIENT
Start: 2021-01-11 | End: 2021-01-11 | Stop reason: DRUGHIGH

## 2021-01-11 RX ADMIN — SIMETHICONE 80 MG: 80 TABLET, CHEWABLE ORAL at 17:13

## 2021-01-11 RX ADMIN — HYDROCODONE BITARTRATE AND ACETAMINOPHEN 1 TABLET: 5; 325 TABLET ORAL at 12:11

## 2021-01-11 RX ADMIN — ATORVASTATIN CALCIUM 20 MG: 20 TABLET, FILM COATED ORAL at 21:23

## 2021-01-11 RX ADMIN — Medication 12.5 MG: at 11:00

## 2021-01-11 RX ADMIN — MIDAZOLAM HYDROCHLORIDE 2 MG: 2 INJECTION, SOLUTION INTRAMUSCULAR; INTRAVENOUS at 08:06

## 2021-01-11 RX ADMIN — ONDANSETRON 4 MG: 2 INJECTION INTRAMUSCULAR; INTRAVENOUS at 10:28

## 2021-01-11 RX ADMIN — FENTANYL CITRATE 100 MCG: 50 INJECTION, SOLUTION INTRAMUSCULAR; INTRAVENOUS at 08:07

## 2021-01-11 RX ADMIN — GLYCOPYRROLATE 0.2 MG: 0.2 INJECTION, SOLUTION INTRAMUSCULAR; INTRAVENOUS at 08:29

## 2021-01-11 RX ADMIN — PHENYLEPHRINE HYDROCHLORIDE 200 MCG: 10 INJECTION INTRAVENOUS at 10:21

## 2021-01-11 RX ADMIN — Medication 10 ML: at 21:35

## 2021-01-11 RX ADMIN — MORPHINE SULFATE 2 MG: 2 INJECTION, SOLUTION INTRAMUSCULAR; INTRAVENOUS at 11:41

## 2021-01-11 RX ADMIN — DOCUSATE SODIUM 100 MG: 100 CAPSULE, LIQUID FILLED ORAL at 21:23

## 2021-01-11 RX ADMIN — NEOSTIGMINE METHYLSULFATE 4 MG: 1 INJECTION INTRAVENOUS at 10:34

## 2021-01-11 RX ADMIN — ROCURONIUM BROMIDE 10 MG: 10 SOLUTION INTRAVENOUS at 09:04

## 2021-01-11 RX ADMIN — GLYCOPYRROLATE 0.6 MG: 0.2 INJECTION, SOLUTION INTRAMUSCULAR; INTRAVENOUS at 10:34

## 2021-01-11 RX ADMIN — POTASSIUM CHLORIDE, DEXTROSE MONOHYDRATE AND SODIUM CHLORIDE 100 ML/HR: 150; 5; 450 INJECTION, SOLUTION INTRAVENOUS at 13:14

## 2021-01-11 RX ADMIN — GENTAMICIN SULFATE 333.6 MG: 40 INJECTION, SOLUTION INTRAMUSCULAR; INTRAVENOUS at 08:16

## 2021-01-11 RX ADMIN — PROPOFOL 200 MG: 10 INJECTION, EMULSION INTRAVENOUS at 08:11

## 2021-01-11 RX ADMIN — SODIUM CHLORIDE, POTASSIUM CHLORIDE, SODIUM LACTATE AND CALCIUM CHLORIDE: 600; 310; 30; 20 INJECTION, SOLUTION INTRAVENOUS at 08:00

## 2021-01-11 RX ADMIN — CEFAZOLIN SODIUM 2 G: 1 INJECTION, POWDER, FOR SOLUTION INTRAMUSCULAR; INTRAVENOUS at 23:41

## 2021-01-11 RX ADMIN — Medication 12.5 MG: at 11:08

## 2021-01-11 RX ADMIN — PHENYLEPHRINE HYDROCHLORIDE 200 MCG: 10 INJECTION INTRAVENOUS at 09:51

## 2021-01-11 RX ADMIN — MIDAZOLAM HYDROCHLORIDE 2 MG: 2 INJECTION, SOLUTION INTRAMUSCULAR; INTRAVENOUS at 08:02

## 2021-01-11 RX ADMIN — FAMOTIDINE 20 MG: 10 INJECTION INTRAVENOUS at 09:18

## 2021-01-11 RX ADMIN — LIDOCAINE HYDROCHLORIDE 20 MG: 20 INJECTION, SOLUTION EPIDURAL; INFILTRATION; INTRACAUDAL; PERINEURAL at 08:11

## 2021-01-11 RX ADMIN — SODIUM CHLORIDE: 9 INJECTION, SOLUTION INTRAVENOUS at 07:20

## 2021-01-11 RX ADMIN — ROCURONIUM BROMIDE 60 MG: 10 SOLUTION INTRAVENOUS at 08:11

## 2021-01-11 RX ADMIN — CEFAZOLIN SODIUM 2 G: 1 INJECTION, POWDER, FOR SOLUTION INTRAMUSCULAR; INTRAVENOUS at 08:22

## 2021-01-11 RX ADMIN — SODIUM CHLORIDE 1000 ML: 9 INJECTION, SOLUTION INTRAVENOUS at 06:47

## 2021-01-11 RX ADMIN — PHENYLEPHRINE HYDROCHLORIDE 200 MCG: 10 INJECTION INTRAVENOUS at 09:03

## 2021-01-11 RX ADMIN — TRAZODONE HYDROCHLORIDE 50 MG: 50 TABLET ORAL at 21:24

## 2021-01-11 RX ADMIN — CEFAZOLIN SODIUM 2 G: 1 INJECTION, POWDER, FOR SOLUTION INTRAMUSCULAR; INTRAVENOUS at 17:14

## 2021-01-11 RX ADMIN — HYDROMORPHONE HYDROCHLORIDE 1 MG: 2 INJECTION INTRAMUSCULAR; INTRAVENOUS; SUBCUTANEOUS at 08:37

## 2021-01-11 RX ADMIN — DEXAMETHASONE SODIUM PHOSPHATE 8 MG: 4 INJECTION, SOLUTION INTRA-ARTICULAR; INTRALESIONAL; INTRAMUSCULAR; INTRAVENOUS; SOFT TISSUE at 09:18

## 2021-01-11 NOTE — ANESTHESIA PREPROCEDURE EVALUATION
Relevant Problems   PERSONAL HX & FAMILY HX OF CANCER   (+) Malignant neoplasm of prostate (HCC)   (+) Prostate cancer ([facility] [address]       Anesthetic History   No history of anesthetic complications            Review of Systems / Medical History  Patient summary reviewed, nursing notes reviewed and pertinent labs reviewed    Pulmonary          Smoker         Neuro/Psych   Within defined limits           Cardiovascular    Hypertension: well controlled          Hyperlipidemia    Exercise tolerance: >4 METS     GI/Hepatic/Renal  Within defined limits              Endo/Other        Cancer     Other Findings   Comments: Smoker 0.5 ppd  Prostate CA            Physical Exam    Airway  Mallampati: II  TM Distance: 4 - 6 cm  Neck ROM: normal range of motion   Mouth opening: Normal     Cardiovascular    Rhythm: regular  Rate: normal         Dental    Dentition: Poor dentition     Pulmonary  Breath sounds clear to auscultation               Abdominal  GI exam deferred       Other Findings   Comments: Multiple missing teeth     Results for Deborrah Sacks (MRN [id number]) as of 1/11/2021 06:35   Ref. Range 1/7/2021 10:30   WBC Latest Ref Range: 4.1 - 11.1 K/uL 5.9   RBC Latest Ref Range: 4.10 - 5.70 M/uL 4.56   HGB Latest Ref Range: 12.1 - 17.0 g/dL 12.5   HCT Latest Ref Range: 36.6 - 50.3 % 36.2 (L)   MCV Latest Ref Range: 80.0 - 99.0 FL 79.4 (L)   MCH Latest Ref Range: 26.0 - 34.0 PG 27.4   MCHC Latest Ref Range: 30.0 - 36.5 g/dL 34.5   RDW Latest Ref Range: 11.5 - 14.5 % 15.2 (H)   PLATELET Latest Ref Range: 150 - 400 K/uL 87 (L)   MPV Latest Ref Range: 8.9 - 12.9 FL 8.2 (L)   Results for Deborrah Sacks (MRN [id number]) as of 1/11/2021 06:35   Ref.  Range 1/7/2021 10:30 1/7/2021 10:35   Sodium Latest Ref Range: 136 - 145 mmol/L 143    Potassium Latest Ref Range: 3.5 - 5.1 mmol/L 4.2    Chloride Latest Ref Range: 97 - 108 mmol/L 111 (H)    CO2 Latest Ref Range: 21 - 32 mmol/L 29    Anion gap Latest Ref Range: 5 - 15 mmol/L 3 (L) Glucose Latest Ref Range: 65 - 100 mg/dL 95    BUN Latest Ref Range: 6 - 20 mg/dL 19    Creatinine Latest Ref Range: 0.70 - 1.30 mg/dL 1.13    BUN/Creatinine ratio Latest Ref Range: 12 - 20   17    Calcium Latest Ref Range: 8.5 - 10.1 mg/dL 9.2    GFR est non-AA Latest Ref Range: >60 ml/min/1.73m2 >60    GFR est AA Latest Ref Range: >60 ml/min/1.73m2 >60      Results for Tj Winkler (MRN [id number]) as of 1/11/2021 06:35   Ref.  Range 1/7/2021 10:30 1/7/2021 10:35   Crossmatch Expiration Unknown 01/28/2021,7800    TYPE & SCREEN Unknown Rpt        Sinus bradycardia   Moderate voltage criteria for LVH, may be normal variant   Borderline ECG   When compared with ECG of 11-AUG-2010 10:50,   No significant change was found   Confirmed by Lawanda Piña MD, Parisa Jimenez ([id number]) on 1/7/2021 1:07:22 PM     Anesthetic Plan    ASA: 2  Anesthesia type: general          Induction: Intravenous  Anesthetic plan and risks discussed with: Patient

## 2021-01-11 NOTE — ROUTINE PROCESS
Pt. Transferred to room 441 via bed in stable condition. Bedside report given to Roz Schwab, [facility]. Pt. Restless, states always restless, and can't sit still, wants to get up and walk. Informed receiving RN of pain med.s given. Pt. States no family in hospital and do not need to call anyone to update.
No

## 2021-01-11 NOTE — ANESTHESIA POSTPROCEDURE EVALUATION
Procedure(s):  ROBOTIC ASSISTED LAPAROSCOPIC PROSTATECTOMY WITH BILATERAL PELVIC LYMPH NODE DISSECTION.     general    Anesthesia Post Evaluation      Multimodal analgesia: multimodal analgesia not used between 6 hours prior to anesthesia start to PACU discharge  Patient location during evaluation: PACU  Patient participation: complete - patient participated  Level of consciousness: agitated and awake and alert  Pain score: 2  Pain management: adequate  Airway patency: patent  Anesthetic complications: no  Cardiovascular status: acceptable and hemodynamically stable  Respiratory status: acceptable, room air and spontaneous ventilation  Hydration status: stable  Comments: C/o stomach pain, pain medication given, no N/V, pt restless and agitated, sat 100% on RA   Post anesthesia nausea and vomiting:  none  Final Post Anesthesia Temperature Assessment:  Normothermia (36.0-37.5 degrees C)      INITIAL Post-op Vital signs:   Vitals Value Taken Time   /59 01/11/21 1130   Temp 36.2 °C (97.1 °F) 01/11/21 1111   Pulse 72 01/11/21 1130   Resp 20 01/11/21 1130   SpO2 96 % 01/11/21 1130

## 2021-01-11 NOTE — PROGRESS NOTES
Cont.  No further med.s ordered at this time. Pt. Restless. States unit can call if needs further orders.

## 2021-01-11 NOTE — PROGRESS NOTES
Four eyes skin assessment completed with Billy Ceballos RN. Scab on L shin, 2 small raw spots on R cheek, surgical incisions x 6.

## 2021-01-11 NOTE — OP NOTES
UROLOGY OPERATIVE NOTE    Patient: Patricia Gill MRN: [id number]  SSN: [id number]    YOB: 1955  Age: 76 y.o. Sex: male          Pre-operative Diagnosis: Prostate CA ([address] [C61]  Post-operative Diagnosis: Prostate CA ([address] [C61]  Procedure: Robotic prostatectomy  Limited pelvic lymph node dissection    Surgeon: Afia Corcoran MD    Anesthesia:  General  Findings: Scarred periprostatic planes   Estimated Blood Loss:       100  Drains: 16F moss catheter  Specimens: left and right pelvic lymph nodes, prostate with seminal vesicles  Implants: * No implants in log *  Complications: none           Procedure Details: The patient was seen in the pre-operative area. The risks, benefits, complications, alternative treatment options, and expected outcomes were again discussed with the patient. The possibilities of reaction to medication, pain, infection, bleeding, major cardiovascular event, death, damage to surrounding structures were specifically addressed. Informed consent was then obtained. Upon arrival to the operative suite, the patient, procedure, and side were confirmed via a pre-operative \"time-out\". All were in agreement. The patient was carefully positioned and anesthesia was undertaken. Sterile prep and drape was accomplished. The patient was placed in a low lithotomy position in trendelenberg. A 18F moss catheter was placed. An 8mm subumbilical incision was made and a Veress needle was inserted into the peritoneum. CO2 insufflation was started to 15mmHg pressure. An 8mm daVinci trocar was placed. Laparoscopy was performed. There were left sigmoid adhesions. 8mm daVinci trocars were placed in the paramedian line and the right abdomen. A 12mm trocar was placed in the left abdomen and a 5mm trocar in the left upper quadrant. The Soundflavori XI surgical robot was docked to the patient. The sigmoid adhesions were taken down.     The Pouch of Abby Viramontes was inspected and the peritoneum was opened over the vas deferens and seminal vesicles. The vas deferens were dissected out and clipped and incised bilaterally. The seminal vesicles were dissected out. The anterior peritoneum was incised in an inverted U shaped incision. The Space of Retzius was dissected to the pubic arches. The left obturator lymph nodes were dissected from the external iliac vein anteriorly, distally to the femoral canal, proximally to the vas deferens and bifurcation, and posteriorly to the obturator vessels and nerves. A similar dissection was performed on the opposite side. The endopelvic fascia was opened on either side of the prostate. The levator muscle was dissected away from the prostate. The puboprostatic ligaments were incised. The dorsal vein complex was suture ligated with 0 Vicryl suture x2. A backbleeding stitch was placed at the bladder neck. The plane between the prostate and bladder was dissected to the catheter. This was withdrawn upward. The lateral and posterior bladder neck dissections was continued. Posteriorly the dissection was joined to the prior seminal vesicle dissection. Denonvillier's fascia was opened and the pararectal space was dissected. The superior pedicles were clipped and incised. The lateral prostatic fascia was released. The posterolateral dissection was taken down to the apex of the prostate with clips and bipolar cautery. Anteriorly the dorsal vein complex was incised. The urethral was incised and the remaining apical attachments were incised. The prostate was entrapped in a specimen bag for later retrieval.      The posterior bladder neck was sewn to the rectourethralis with 0 Vicryl. The tails were left long. Doubled 3-0 V-marty suture was used to create the urethrovesical anastomosis. A new 16F moss catheter was easily placed. The posterior support suture was stitched to the pubic symphysis.    Irrigation of the moss with 180cc revealed no leakage. The robot was undocked from the patient. The specimen bag string was withdrawn through the subumbilical incision and the remaining ports were removed under direct vision. The subumbilical incision was extended slightly to allow extraction of the specimen. Specimen was passed off for pathologic submission. The transversalis fascia was anesthetized with 0.25% Marcaine. A fascia was closed with 0 PDS suture. The skin incisions were closed with 4-0 Monocryl subcuticular sutures and Dermabond skin glue. At the conclusion of the case, all needle counts, instrument counts, and sponge counts were correct. The patient was transported in stable condition to recovery.      Bere Bowen MD

## 2021-01-12 VITALS
HEIGHT: 70 IN | TEMPERATURE: 98.9 F | RESPIRATION RATE: 16 BRPM | SYSTOLIC BLOOD PRESSURE: 130 MMHG | HEART RATE: 64 BPM | DIASTOLIC BLOOD PRESSURE: 67 MMHG | OXYGEN SATURATION: 100 % | WEIGHT: 147 LBS | BODY MASS INDEX: 21.05 KG/M2

## 2021-01-12 LAB
ANION GAP SERPL CALC-SCNC: 2 MMOL/L (ref 5–15)
BUN SERPL-MCNC: 13 MG/DL (ref 6–20)
BUN/CREAT SERPL: 10 (ref 12–20)
CA-I BLD-MCNC: 8.4 MG/DL (ref 8.5–10.1)
CHLORIDE SERPL-SCNC: 112 MMOL/L (ref 97–108)
CO2 SERPL-SCNC: 27 MMOL/L (ref 21–32)
CREAT SERPL-MCNC: 1.32 MG/DL (ref 0.7–1.3)
ERYTHROCYTE [DISTWIDTH] IN BLOOD BY AUTOMATED COUNT: 15.5 % (ref 11.5–14.5)
GLUCOSE SERPL-MCNC: 129 MG/DL (ref 65–100)
HCT VFR BLD AUTO: 30.9 % (ref 36.6–50.3)
HGB BLD-MCNC: 10.5 G/DL (ref 12.1–17)
MCH RBC QN AUTO: 27.1 PG (ref 26–34)
MCHC RBC AUTO-ENTMCNC: 34 G/DL (ref 30–36.5)
MCV RBC AUTO: 79.8 FL (ref 80–99)
PLATELET # BLD AUTO: 77 K/UL (ref 150–400)
PMV BLD AUTO: 8.4 FL (ref 8.9–12.9)
POTASSIUM SERPL-SCNC: 3.9 MMOL/L (ref 3.5–5.1)
RBC # BLD AUTO: 3.87 M/UL (ref 4.1–5.7)
SODIUM SERPL-SCNC: 141 MMOL/L (ref 136–145)
WBC # BLD AUTO: 6.8 K/UL (ref 4.1–11.1)

## 2021-01-12 PROCEDURE — 99024 POSTOP FOLLOW-UP VISIT: CPT | Performed by: NURSE PRACTITIONER

## 2021-01-12 PROCEDURE — 85027 COMPLETE CBC AUTOMATED: CPT

## 2021-01-12 PROCEDURE — 74011250637 HC RX REV CODE- 250/637: Performed by: UROLOGY

## 2021-01-12 PROCEDURE — 74011250636 HC RX REV CODE- 250/636: Performed by: UROLOGY

## 2021-01-12 PROCEDURE — 74011250636 HC RX REV CODE- 250/636: Performed by: NURSE PRACTITIONER

## 2021-01-12 PROCEDURE — 36415 COLL VENOUS BLD VENIPUNCTURE: CPT

## 2021-01-12 PROCEDURE — 80048 BASIC METABOLIC PNL TOTAL CA: CPT

## 2021-01-12 PROCEDURE — 74011000250 HC RX REV CODE- 250: Performed by: UROLOGY

## 2021-01-12 RX ORDER — HYDROCODONE BITARTRATE AND ACETAMINOPHEN 5; 325 MG/1; MG/1
1 TABLET ORAL
Qty: 10 TAB | Refills: 0 | Status: SHIPPED | OUTPATIENT
Start: 2021-01-12 | End: 2021-01-15

## 2021-01-12 RX ORDER — DOCUSATE SODIUM 100 MG/1
100 CAPSULE, LIQUID FILLED ORAL 2 TIMES DAILY
Qty: 20 CAP | Refills: 0 | Status: SHIPPED | OUTPATIENT
Start: 2021-01-12 | End: 2021-01-22

## 2021-01-12 RX ORDER — CEPHALEXIN 500 MG/1
500 CAPSULE ORAL 3 TIMES DAILY
Qty: 36 CAP | Refills: 0 | Status: SHIPPED | OUTPATIENT
Start: 2021-01-12 | End: 2021-01-24

## 2021-01-12 RX ORDER — FACIAL-BODY WIPES
10 EACH TOPICAL DAILY
Qty: 10 SUPPOSITORY | Refills: 1 | Status: SHIPPED | OUTPATIENT
Start: 2021-01-12 | End: 2021-01-22

## 2021-01-12 RX ADMIN — ACETAMINOPHEN 650 MG: 325 TABLET, FILM COATED ORAL at 13:39

## 2021-01-12 RX ADMIN — POTASSIUM CHLORIDE, DEXTROSE MONOHYDRATE AND SODIUM CHLORIDE 100 ML/HR: 150; 5; 450 INJECTION, SOLUTION INTRAVENOUS at 02:35

## 2021-01-12 RX ADMIN — HYDROCODONE BITARTRATE AND ACETAMINOPHEN 1 TABLET: 5; 325 TABLET ORAL at 05:10

## 2021-01-12 RX ADMIN — CEFAZOLIN SODIUM 2 G: 1 INJECTION, POWDER, FOR SOLUTION INTRAMUSCULAR; INTRAVENOUS at 08:32

## 2021-01-12 NOTE — PROGRESS NOTES
Fresh ice water and snack to pt-applesauce. Pt called nurse back into room, points to lower middle tooth, ''loose'', pt says it ''wiggles'' and painful when attempts to eat, pt says was not like that prior to surgery and feels may have been loosened during airway insertion during surgery. Will notify anesthesia.

## 2021-01-12 NOTE — PROGRESS NOTES
Pt ambulated around unit with nurse walking with him, tolerated well, pt then sat in recliner. Pt now back in bed, no complaints.

## 2021-01-12 NOTE — PROGRESS NOTES
Harley De La Garza, RN, nursing supervisor notified of pt c/o tooth  Injury, she instructed to notify JOSE ANTONIO heredia. David Call, notified of pt complaint, name, room number given.

## 2021-01-12 NOTE — DISCHARGE INSTRUCTIONS
LEAVING THE HOSPITAL AFTER YOUR PROSTATECTOMY  DISCHARGE INSTRUCTIONS FOR DR. PIERCE'S PATIENTS    Your Reilly Catheter   You will be discharged from the hospital with a urinary catheter in place. It is held inside of the bladder by a balloon. It allows for continuous drainage of urine into an external drainage bag. This must stay in place while you heal.   It will be secured with a STATLOCK. This is a small sticker applied to your thigh with a plastic clip. It helps to keep the catheter in place so that it will not pull on you. This will be removed in 1 week when your catheter is removed.  Do not try to remove your catheter. It must stay in place until your follow up appointment while you heal.   Your nurses will teach you how to empty the drainage bag before you go home. Activity   Do not drive while you have your Reilly catheter (~1 week).  Refrain from vigorous activity (running, golfing, exercising, horseback riding, bicycling) for 4-6 weeks after your surgery. Walking is fine.  Avoid sitting still in one position for prolonged periods of time (more than 45 minutes). Bathing   Do not soak in tubs, or swim, or submerge yourself in water.  You may shower as soon as you get home from the hospital.  Do not scrub the glue on your incision sites. Work   When you may return to work is variable. It will depend on your occupation and how quickly you recover. Specific questions can be addressed at your 1 week follow up. Medication   Most patients experience only minimal discomfort. Dr. Rodriguez prefers you treat this with Tylenol (avoid ibuprofen or aspirin). Usually, Tylenol is enough to treat the pain.  You will be sent home with a stronger, prescription pain reliever. However, it is important to note that these medications tend to be EXTREMELY constipating. It is better to avoid them, and only take them if you are having significant pain.      You will also have 10 days of an antibiotic. You will take your antibiotic from the time you get home until about 3 days after your first follow up visit.  You can resume most of your home medications as soon as you leave the hospital except for anti-coagulants like Coumadin, aspirin, or Eloquis. Some diabetic medications are also not ok to resume. If you have questions about your regular medications, please call the office.  At the time of discharge, you will also have a prescription for the stool softener and the suppository you received during your hospital stay. You may take these daily until you have a bowel movement. You may continue taking the stool softener as needed to combat constipation. Food   We recommend you stick to a bland, soft diet immediately after you are discharged from the hospital.     Do not force yourself to eat. It can contribute to the abdominal bloating you may feel. Once you have had a bowel movement, you can start eating normally, as you feel comfortable.  Avoid gas producing foods (beans, flour, broccoli) that can make you more uncomfortable.  Spread out eating throughout the day with snacks and small meals. Avoid eating large meals at first.    [facility] AFTER SURGERY   Abdominal distension, constipation, or bloating. Make sure you are taking your stool softener as directed and drinking plenty of water. Avoid carbonated beverages and gas-producing foods. You can use a suppository daily. The prescription pain medicine can contribute to this- so only take it when you are having significant pain. Walking and moving around help to move the gas out of your belly.  Bladder spasm This is typically associated with the sudden onset of lower abdominal discomfort, a strong urge to urinate, or sudden leakage around your catheter. If this persists, call the office.    Catheter Leakage  Many times if your catheter is kinked or obstructed by a kink in the tubing, you can leak at the insertion site. Please make sure to regularly check for kinks or bends in the tubing.  Blood in the urine. You may have blood in your urine off and on for several weeks after a urologic procedure. The blood can make your urine look tea-colored or pink.   This is normal.      WHEN TO CALL THE DOCTOR:   You have a fever over 100.5F   You have nausea or vomiting for more than 24 hours   It becomes hard to breathe   You cannot urinate        [facility] [address], [address] [place] [address]

## 2021-01-12 NOTE — PROGRESS NOTES
Patient given discharge instructions and verbalizes understanding. Instructed patient on catheter care and gave instructions. IV's removed from left and right arms with tip intact. Patient remains stable at this time. Patient discharged via wheelchair with friend to drive him home.

## 2021-01-12 NOTE — DISCHARGE SUMMARY
UROLOGY Progress Note         ROSS Willis, FNP-C  Urology, [facility]  Supervising Physician: Dr. Christina Luz MD  [phone]        PROVIDER DISCHARGE SUMMARY     Patient ID:    Leonardo Jones  [id number]  52 y.o.  1955    Admit date: 1/11/2021    Discharge date : 1/12/2021    Diagnoses: Prostate CA [facility] [place] Sherri  Prostate cancer [facility] [place]    Reason for Hospitalization: surgery, or treatment for above mentioned diagnosis      Procedures: Robotic prostatectomy, Limited pelvic lymph node dissection on 1/11/21. Hospital Course: Uncomplicated hospital course following surgery. Pain is well-controlled. Patient is tolerating a diet. Patient is ambulatory. Labs are reviewed and felt to be satisfactory for discharge. Incision sites are clean, dry and intact. Follow up Care: Your appointment with us is on: 1/20/21 at 2 pm    Discharge Medications:   Current Discharge Medication List      START taking these medications    Details   HYDROcodone-acetaminophen (NORCO) 5-325 mg per tablet Take 1 Tab by mouth every six (6) hours as needed for Pain for up to 3 days. Max Daily Amount: 4 Tabs. Qty: 10 Tab, Refills: 0    Associated Diagnoses: Prostate cancer ([address]      docusate sodium (COLACE) 100 mg capsule Take 1 Cap by mouth two (2) times a day for 10 days. Qty: 20 Cap, Refills: 0      bisacodyL (DULCOLAX) 10 mg supp Insert 10 mg into rectum daily for 10 days. Qty: 10 Suppository, Refills: 1      cephALEXin (KEFLEX) 500 mg capsule Take 1 Cap by mouth three (3) times daily for 12 days. Qty: 36 Cap, Refills: 0         CONTINUE these medications which have NOT CHANGED    Details   ergocalciferol (Vitamin D2) 1,250 mcg (50,000 unit) capsule Take 50,000 Units by mouth every seven (7) days. multivitamin (ONE A DAY) tablet Take 1 Tab by mouth daily. simvastatin (ZOCOR) 40 mg tablet Take 40 mg by mouth nightly. lisinopriL (PRINIVIL, ZESTRIL) 2.5 mg tablet Take 2.5 mg by mouth daily. traZODone (DESYREL) 50 mg tablet Take 50 mg by mouth nightly. Diet: ok to resume home diet. Do not overeat. Use your bowel regimen to facilitate regular, soft, formed bowel movements. If you are experiencing discomfort on post-op day 1-3, stick to a bland, liquid diet until you are feeling less uncomfortable, or having bowel movements. Activity: As per your discharge instructions. No heavy lifting or strenuous exercise. No soaking in tubs or pools. Do not drive on narcotic pain medications. Disposition: home with self care      Discharge Exam:  Physical Exam  Vitals signs and nursing note reviewed. Constitutional:       Appearance: Normal appearance. HENT:      Head: Normocephalic and atraumatic. Mouth/Throat:      Mouth: Mucous membranes are moist.      Comments: Loose tooth  Eyes:      Extraocular Movements: Extraocular movements intact. Conjunctiva/sclera: Conjunctivae normal.      Pupils: Pupils are equal, round, and reactive to light. Neck:      Musculoskeletal: Normal range of motion. Cardiovascular:      Rate and Rhythm: Normal rate. Pulmonary:      Effort: Pulmonary effort is normal.   Abdominal:      General: Abdomen is flat. There is no distension. Palpations: There is no mass. Tenderness: There is no abdominal tenderness. Hernia: No hernia is present. Comments: Incision sites clean, dry, intact. Minimal ooze from umbilical site, can apply clean guaze. Genitourinary:     Penis: Normal.       Testes: Normal.   Musculoskeletal: Normal range of motion. Skin:     General: Skin is warm and dry. Neurological:      General: No focal deficit present. Mental Status: He is alert and oriented to person, place, and time.    Psychiatric:         Mood and Affect: Mood normal.         Behavior: Behavior normal.          Visit Vitals  BP (!) 112/44   Pulse 69   Temp 98.4 °F (36.9 °C)   Resp 16   Ht 5' 9.68\" (1.77 m)   Wt 147 lb (66.7 kg)   SpO2 95%   BMI 21.29 kg/m²       Significant Diagnostic Studies:   1/12/2021: BUN 13 mg/dL (Ref range: 6 - 20 mg/dL); Calcium 8.4 mg/dL* (Ref range: 8.5 - 10.1 mg/dL); CO2 27 mmol/L (Ref range: 21 - 32 mmol/L); Creatinine 1.32 mg/dL* (Ref range: 0.70 - 1.30 mg/dL); Glucose 129 mg/dL* (Ref range: 65 - 100 mg/dL); HCT 30.9 %* (Ref range: 36.6 - 50.3 %); HGB 10.5 g/dL* (Ref range: 12.1 - 17.0 g/dL); Potassium 3.9 mmol/L (Ref range: 3.5 - 5.1 mmol/L); Sodium 141 mmol/L (Ref range: 136 - 145 mmol/L)  Recent Labs     01/12/21  0545   WBC 6.8   HGB 10.5*   HCT 30.9*   PLT 77*     Recent Labs     01/12/21  0545      K 3.9   *   CO2 27   BUN 13   CREA 1.32*   *   CA 8.4*       INPATIENT CONSULTATIONS: none    OUTPATIENT FOLLOW UP REMINDERS: call the office with any questions/concerns during your recovery. PLAN FOR DISCHARGE. PATIENT TO FOLLOW UP AS SCHEDULED. NURSING STAFF TO PROVIDE EDUCATION MATERIALS AND DISCHARGE INSTRUCTION SET SPECIFIC TO THIS PATIENT AS REQUESTED BY ME. POST-OPERATIVE COUNSELING PROVIDED BY ME PRIOR TO DISCHARGE.       Signed:  Cherelle Ku NP  1/12/2021  10:14 AM .

## 2021-01-12 NOTE — PROGRESS NOTES
Pt medicated with Norco for pain, says he has not had BM in two days, is passing gas. Instructed pt that he has Dulcolax supp, ordered if needed, voices understanding.

## 2021-01-13 LAB
BACTERIA SPEC CULT: NORMAL
SPECIAL REQUESTS,SREQ: NORMAL

## 2021-01-18 PROBLEM — R82.79 URINE CULTURE POSITIVE: Status: RESOLVED | Noted: 2020-07-29 | Resolved: 2021-01-18

## 2021-01-18 PROBLEM — R31.29 MICROSCOPIC HEMATURIA: Status: RESOLVED | Noted: 2020-07-29 | Resolved: 2021-01-18

## 2021-01-18 PROBLEM — R32 URINARY INCONTINENCE: Status: ACTIVE | Noted: 2021-01-18

## 2021-01-18 PROBLEM — C61 PROSTATE CANCER (HCC): Status: RESOLVED | Noted: 2021-01-11 | Resolved: 2021-01-18

## 2021-01-18 PROBLEM — R35.0 FREQUENCY OF MICTURITION: Status: RESOLVED | Noted: 2020-07-29 | Resolved: 2021-01-18

## 2021-01-18 NOTE — ASSESSMENT & PLAN NOTE
He is s/p robotic prostatectomy, Limited pelvic lymph node dissection on 1/11/21. Pathology significant for Joby score 4+3=7, negative margins, negative nodes. PT2N0. Plan for PSA at 1 month.

## 2021-01-18 NOTE — PATIENT INSTRUCTIONS
Kegel Exercise For Men  
 
Kegel exercises in men are used to strengthen the pelvic floor just as they are for women. They are referred to as kegels or pelvic floor exercises. The muscle is similar in both men and women, stretching from the pubic bone to the tailbone and forming a hammock- like floor that supports the organs of the pelvis and contributes to the function of the sphincter. The exercise is recommended as a first step for treating urinary incontinence. Once you have become accustomed to tightening the muscle, Kegel exercises can be done anytime, anywhere. How do I locate the right muscles? ? One of the easiest ways to locate your muscles is during urination. Once your flow begins, try to stop it completely. The muscle that you feel tightening is the urinary sphincter muscle or pelvic floor. Don't tense the muscles in your buttocks, legs or abdomen and don't hold your breath. ? When you can slow or stop the flow of urine, you've successfully located these muscles. When you perform the exercise correctly, You should be able to feel or see penis or the testicles lift. ? Some men find these muscles by imaging that they are trying to stop the passage of gas. If you don't get it, you may try inserting a finger (use lubrication) into the anus and try to  your finger. If you are able to do that, you've found the right muscle When you're first starting, it may be easier to do Kegel exercises lying down, so your muscles aren't fighting against gravity. It may also be easier to contract the muscles for just two or three seconds at first. There are several ways to perform Kegel exercises. At first you may need to perform these exercises while sitting or lying down. As the muscles strengthen you can progress to exercise standing up. Like any activity, start with what you can achieve and progress from there. Remember to use your muscles whenever you exert yourself during your daily activities. ? Do NOT hold your breath ? Do NOT push down instead of squeeze and lift ? Do NOT pull your tummy in tightly ? Do NOT tighten your buttocks and thighs In order to improve strength of the muscle, you use a squeeze, hold, release pattern. You hold for several seconds and release. Gradually, you would like to be able to build up to a hold of  squeeze, hold and release 5 to 10 times in a row. When you perform the exercise correctly, you should be able to feel or see penis or the testicles lift. This takes time for some men. But, if you practice the routine regularly, you should notice an improvement in 4-6 weeks. A few good contractions are more beneficial than many half- hearted ones and good results take time and effort. Your doctor recommends a goal of 5 sets of 5 contractions daily. Remember them when you wake, when you sleep and every meal.  
 
Remember when to use the muscles when you need them most. That is, always tighten before you cough, sneeze, lift, bend or get up out of a chair, etc.  
 
Your doctor does not recommend interrupting your flow as a way to practice kegels. It is only a method to help identify the muscles. The urinary sphincter muscles matter more than the anal sphincter muscles. Some men can isolate these forward muscles rather than squeezing the entire pelvic floor. Hints: ? Keep your weight within a healthy range for your height and age ? Drink enough water to keep the urine light in color ? Seek medical advice for chronic cough ? Develop good bowel habits If you have trouble performing Kegel exercises on your own, you may need help. Improved results can be achieved by seeking help from physiotherapist (with training in continence) who will design an individual training program especially suited to you.  Call your doctor if you would like to participate in a training program with a physical therapist.

## 2021-01-18 NOTE — PROGRESS NOTES
HISTORY OF PRESENT ILLNESS  Drexel Crigler is a 76 y.o. male. No chief complaint on file. He is s/p robotic prostatectomy, limited pelvic lymph node dissection on 1/11/21. Pathology significant for Joby score 4+3=7, negative margins, negative nodes. PT2N0. Reilly removed today in clinic. Educated on [employer]. Plan for 1 month f/u with PSA prior. Chronic Conditions Addressed Today     1. Malignant neoplasm of prostate [facility])     Overview      He had a prostate biopsy by Dr. Ezio Horn on 2/12/2020. PSA October 2019 was 6.4. There was a single focus of [place]'s grade 3+3 at the left apex. CT on 3/13/2020 revealed splenomegaly and signs of portal venous hypertension. There is no evidence of metastatic disease. Bone scan 3/13/2020 revealed no metastatic disease. The patient had an MRI of the pelvis on 8/21/2020. There were no concerning findings. He is s/p robotic prostatectomy, Limited pelvic lymph node dissection on 1/11/21. Pathology significant for Joby score 4+3=7, negative margins, negative nodes. pT2N0. Current Assessment & Plan      He is s/p robotic prostatectomy, Limited pelvic lymph node dissection on 1/11/21. Pathology significant for [place] score 4+3=7, negative margins, negative nodes. PT2N0. Plan for PSA at 1 month. 2. Urinary incontinence     Overview      He is s/p robotic prostatectomy on 1/11/21. Reilly removed in clinic today. He is educated on the importance of kegel exercises to strengthen the pelvic floor, improve bladder control and regain continence. Review of Systems   Gastrointestinal:        Per HPI   Genitourinary:        Per HPI   All other systems reviewed and are negative.       Past Medical History:   Diagnosis Date    Hypercholesteremia     Hypertension     Insomnia     Malignant neoplasm of prostate ([facility] [address] 7/29/2020    Microscopic hematuria 7/29/2020      Past Surgical History:   Procedure Laterality Date    HX PROSTATE SURGERY  01/11/2021    Limited pelvic lymph node dissection    HX PROSTATECTOMY  01/11/2021    Robotic prostatectomy    HX UROLOGICAL      cystourethroscopy BRP    HX WISDOM TEETH EXTRACTION      when he was little    VT CYSTOURETHROSCOPY      VT CYSTOURETHROSCOPY,URETER CATHETER       Family History   Problem Relation Age of Onset    Colon Cancer Brother     Cancer Brother     Heart Disease Mother     Hypertension Mother     Cancer Father     Cancer Paternal Grandfather         Physical Exam  Vitals signs reviewed. HENT:      Head: Normocephalic and atraumatic. Eyes:      Extraocular Movements: Extraocular movements intact. Conjunctiva/sclera: Conjunctivae normal.   Neck:      Musculoskeletal: Normal range of motion. Pulmonary:      Effort: Pulmonary effort is normal.   Abdominal:      General: There is no distension. Palpations: Abdomen is soft. Tenderness: There is no abdominal tenderness. Hernia: No hernia is present. Comments: Surgical incision sites CDI; well-healing   Genitourinary:     Penis: Normal.       Comments: Reilly removed  Musculoskeletal: Normal range of motion. Skin:     General: Skin is warm and dry. Neurological:      Mental Status: He is alert and oriented to person, place, and time. Mental status is at baseline. Psychiatric:         Mood and Affect: Mood normal.         Behavior: Behavior normal.         ASSESSMENT and PLAN  Diagnoses and all orders for this visit:    1. Malignant neoplasm of prostate [facility])  Assessment & Plan:  He is s/p robotic prostatectomy, Limited pelvic lymph node dissection on 1/11/21. Pathology significant for [place] score 4+3=7, negative margins, negative nodes. PT2N0. Plan for PSA at 1 month.       2. Urinary incontinence, unspecified type               Nate Urias NP

## 2021-01-19 NOTE — PROGRESS NOTES
Made discharge follow up call back with patient today 1/19/2021 @ 2280. Patient stated that he is doing good and has his post surgery follow up with Dr. Alexis Jeffers tomorrow. Patient stated that he went to see his dentist about the loose tooth from surgery and stated that he needs to have some dental work done. Asked how to get the information to the hospital and I informed him to speak with Dr. Tessy Pierre office on where and how to submit the paperwork when he makes his visit tomorrow. Otherwise, patient stated that he was feeling much better.

## 2021-01-20 ENCOUNTER — OFFICE VISIT (OUTPATIENT)
Dept: UROLOGY | Age: 66
End: 2021-01-20
Payer: MEDICARE

## 2021-01-20 VITALS — WEIGHT: 140 LBS | HEIGHT: 69 IN | TEMPERATURE: 97.1 F | BODY MASS INDEX: 20.73 KG/M2

## 2021-01-20 DIAGNOSIS — R32 URINARY INCONTINENCE, UNSPECIFIED TYPE: ICD-10-CM

## 2021-01-20 DIAGNOSIS — C61 MALIGNANT NEOPLASM OF PROSTATE (HCC): ICD-10-CM

## 2021-01-20 PROCEDURE — 99024 POSTOP FOLLOW-UP VISIT: CPT | Performed by: NURSE PRACTITIONER

## 2021-01-20 PROCEDURE — 51700 IRRIGATION OF BLADDER: CPT | Performed by: NURSE PRACTITIONER

## 2021-01-20 NOTE — ASSESSMENT & PLAN NOTE
He is now s/p prostatectomy. Reilly removed in clinic today. He was unable to tolerate more than 100 mls during the trial.  He voided about 50 mls. He is instructed to seek emergency care right away for catheter replacement if he is unable to void within the next 6-8 hours. He is educated on the importance of kegel exercises to strengthen the pelvic floor, improve bladder control and regain continence.

## 2021-02-18 DIAGNOSIS — C61 MALIGNANT NEOPLASM OF PROSTATE (HCC): ICD-10-CM

## 2021-02-18 PROBLEM — N52.9 IMPOTENCE: Status: ACTIVE | Noted: 2021-02-18

## 2021-02-22 ENCOUNTER — TELEPHONE (OUTPATIENT)
Dept: UROLOGY | Age: 66
End: 2021-02-22

## 2021-02-22 NOTE — TELEPHONE ENCOUNTER
Mr. Christiano Jose Alejandro said that he would come by to  his Lab Order to get his blood work done before his appt.  On Tuesday morning because when he went to [facility] they said that no orders for him were but there were 3 pending  orders in his chart as of 2/18

## 2021-02-22 NOTE — TELEPHONE ENCOUNTER
Mr, Franca Ledy called about his appointment and was confused about dates for appointment. I let her know we changed the days but Lab paddy did not have an order in for the blood work.

## 2021-02-23 ENCOUNTER — OFFICE VISIT (OUTPATIENT)
Dept: UROLOGY | Age: 66
End: 2021-02-23
Payer: MEDICARE

## 2021-02-23 VITALS — WEIGHT: 140 LBS | HEIGHT: 69 IN | TEMPERATURE: 97.7 F | BODY MASS INDEX: 20.73 KG/M2

## 2021-02-23 DIAGNOSIS — R32 URINARY INCONTINENCE, UNSPECIFIED TYPE: ICD-10-CM

## 2021-02-23 DIAGNOSIS — N39.0 URINARY TRACT INFECTION WITH HEMATURIA, SITE UNSPECIFIED: ICD-10-CM

## 2021-02-23 DIAGNOSIS — Z13.9 ENCOUNTER FOR SCREENING INVOLVING SOCIAL DETERMINANTS OF HEALTH (SDOH): ICD-10-CM

## 2021-02-23 DIAGNOSIS — R31.9 URINARY TRACT INFECTION WITH HEMATURIA, SITE UNSPECIFIED: ICD-10-CM

## 2021-02-23 DIAGNOSIS — N52.9 IMPOTENCE: ICD-10-CM

## 2021-02-23 DIAGNOSIS — C61 MALIGNANT NEOPLASM OF PROSTATE (HCC): Primary | ICD-10-CM

## 2021-02-23 LAB
BILIRUB UR QL STRIP: NORMAL
GLUCOSE UR-MCNC: NEGATIVE MG/DL
KETONES P FAST UR STRIP-MCNC: NEGATIVE MG/DL
PH UR STRIP: 6 [PH] (ref 4.6–8)
PROT UR QL STRIP: NORMAL
SP GR UR STRIP: 1.03 (ref 1–1.03)
UA UROBILINOGEN AMB POC: NORMAL (ref 0.2–1)
URINALYSIS CLARITY POC: CLEAR
URINALYSIS COLOR POC: YELLOW
URINE BLOOD POC: NORMAL
URINE LEUKOCYTES POC: NORMAL
URINE NITRITES POC: NEGATIVE

## 2021-02-23 PROCEDURE — 81003 URINALYSIS AUTO W/O SCOPE: CPT | Performed by: NURSE PRACTITIONER

## 2021-02-23 PROCEDURE — 99024 POSTOP FOLLOW-UP VISIT: CPT | Performed by: NURSE PRACTITIONER

## 2021-02-23 NOTE — ASSESSMENT & PLAN NOTE
He is s/p prostatectomy on 1/11/2021. There have been several missed f/u appointments and labs. Luisa's 4+3 disease, negative margins/nodes. He had his lab work done today, prior to his visit. We will plan f/u in 3 months with PSA prior.

## 2021-02-23 NOTE — ASSESSMENT & PLAN NOTE
He notes partial erections. He has not attempted intercourse. He will give me a call if he is having problems over the next few months as he slowly attempts.

## 2021-02-23 NOTE — PROGRESS NOTES
HISTORY OF PRESENT ILLNESS  Nayan Vásquez is a 76 y.o. male. Chief Complaint   Patient presents with    Follow-up    Urinary Incontinence    Prostate Cancer     He is voiding with good control; he is not wearing protective undergarments and not holding his urine. He reports only several small volume leaks over the last month. He will start to pay more attention to sexual function. He notes early morning and late evening erections, but has not attempted intercourse at this time. He will let me know if he has problems with intercourse over the next few months. Surgical incision sites are well-healed. Chronic Conditions Addressed Today     1. Malignant neoplasm of prostate [facility]) - Primary     Overview      He had a prostate biopsy by Dr. Jose Mehta on 2/12/2020. PSA October 2019 was 6.4. There was a single focus of [place]'s grade 3+3 at the left apex. CT on 3/13/2020 revealed splenomegaly and signs of portal venous hypertension. There is no evidence of metastatic disease. Bone scan 3/13/2020 revealed no metastatic disease. The patient had an MRI of the pelvis on 8/21/2020. There were no concerning findings. He is s/p robotic prostatectomy, Limited pelvic lymph node dissection on 1/11/21. Pathology significant for [place] score 4+3=7, negative margins, negative nodes. pT2N0. Current Assessment & Plan      He is s/p prostatectomy on 1/11/2021. There have been several missed f/u appointments and labs. Luisa's 4+3 disease, negative margins/nodes. He had his lab work done today, prior to his visit. We will plan f/u in 3 months with PSA prior. 2. Urinary incontinence     Overview      He is s/p robotic prostatectomy on 1/11/21. 3. Impotence     Overview      He is s/p prostatectomy on 1/11/21.         4. Encounter for screening involving social determinants of health (SDoH)     Overview      Transportation, missed appointments/labs.   The one month PSA/follow-up was missed and rescheduled today. The labs were not done, despite reminders. Current Assessment & Plan      The social determinants of health may impact the way in which care and instruction is provided to this patient. He is dependant upon transportation services to make it to his appointments. He may need assistance/reminders to ensure the appropriate labs have been done on the appropriate schedule. Review of Systems   Constitutional: Negative for chills and fever. HENT: Negative for sinus pain and sore throat. Respiratory: Negative for cough, sputum production and shortness of breath. Gastrointestinal: Negative for diarrhea, nausea and vomiting. Musculoskeletal: Negative for myalgias. Neurological: Negative for headaches. Physical Exam  Abdominal:      Comments: Abdominal incision sites are well-healed. ASSESSMENT and PLAN  Diagnoses and all orders for this visit:    1. Malignant neoplasm of prostate [facility])  Assessment & Plan:  He is s/p prostatectomy on 1/11/2021. There have been several missed f/u appointments and labs. Luisa's 4+3 disease, negative margins/nodes. He had his lab work done today, prior to his visit. We will plan f/u in 3 months with PSA prior. Orders:  -     AMB POC URINALYSIS DIP STICK AUTO W/O MICRO  -     PSA, DIAGNOSTIC (PROSTATE SPECIFIC AG); Future    2. Urinary incontinence, unspecified type  Assessment & Plan:  Good urine control at 1 month post-op. He is not leaking urine, is not using protective undergarments. 3. Impotence  Assessment & Plan:  He notes partial erections. He has not attempted intercourse. He will give me a call if he is having problems over the next few months as he slowly attempts.       4. Encounter for screening involving social determinants of health [facility]  Assessment & Plan:  The social determinants of health may impact the way in which care and instruction is provided to this patient. He is dependant upon transportation services to make it to his appointments. He may need assistance/reminders to ensure the appropriate labs have been done on the appropriate schedule. Follow-up and Dispositions    · Return in about 3 months (around 5/23/2021) for PSA prior.          Lolita Deshpande NP

## 2021-02-23 NOTE — ASSESSMENT & PLAN NOTE
Good urine control at 1 month post-op. He is not leaking urine, is not using protective undergarments.

## 2021-02-23 NOTE — ASSESSMENT & PLAN NOTE
The social determinants of health may impact the way in which care and instruction is provided to this patient. He is dependant upon transportation services to make it to his appointments. He may need assistance/reminders to ensure the appropriate labs have been done on the appropriate schedule.

## 2021-02-24 LAB — PSA SERPL-MCNC: <0.1 NG/ML (ref 0–4)

## 2021-02-25 LAB
APPEARANCE UR: CLEAR
BACTERIA #/AREA URNS HPF: ABNORMAL /[HPF]
BACTERIA UR CULT: NO GROWTH
BILIRUB UR QL STRIP: NEGATIVE
CASTS URNS QL MICRO: ABNORMAL /LPF
COLOR UR: YELLOW
EPI CELLS #/AREA URNS HPF: ABNORMAL /HPF (ref 0–10)
GLUCOSE UR QL: NEGATIVE
HGB UR QL STRIP: NEGATIVE
KETONES UR QL STRIP: NEGATIVE
LEUKOCYTE ESTERASE UR QL STRIP: ABNORMAL
MICRO URNS: ABNORMAL
MUCOUS THREADS URNS QL MICRO: PRESENT
NITRITE UR QL STRIP: NEGATIVE
PH UR STRIP: 5.5 [PH] (ref 5–7.5)
PROT UR QL STRIP: ABNORMAL
RBC #/AREA URNS HPF: ABNORMAL /HPF (ref 0–2)
SP GR UR: 1.03 (ref 1–1.03)
UROBILINOGEN UR STRIP-MCNC: 1 MG/DL (ref 0.2–1)
WBC #/AREA URNS HPF: ABNORMAL /HPF (ref 0–5)

## 2021-03-02 NOTE — PROGRESS NOTES
He has pyuria, no symptoms of infection, no bacteria on microscopic evaluation. Likely contaminated secondary to patient's hygiene practices.

## 2021-03-02 NOTE — PROGRESS NOTES
Please let patient know that his PSA is undetectable, which is great. Exactly what we wanted to see following his surgery. Continue as planned with f/u in 3 months.

## 2021-04-13 ENCOUNTER — OFFICE VISIT (OUTPATIENT)
Dept: INTERNAL MEDICINE CLINIC | Age: 66
End: 2021-04-13
Payer: MEDICARE

## 2021-04-13 VITALS
DIASTOLIC BLOOD PRESSURE: 67 MMHG | OXYGEN SATURATION: 99 % | HEIGHT: 69 IN | HEART RATE: 60 BPM | TEMPERATURE: 97.8 F | WEIGHT: 139 LBS | SYSTOLIC BLOOD PRESSURE: 118 MMHG | BODY MASS INDEX: 20.59 KG/M2 | RESPIRATION RATE: 16 BRPM

## 2021-04-13 DIAGNOSIS — F17.218 CIGARETTE NICOTINE DEPENDENCE WITH OTHER NICOTINE-INDUCED DISORDER: ICD-10-CM

## 2021-04-13 DIAGNOSIS — L60.2 OVERGROWN TOENAILS: ICD-10-CM

## 2021-04-13 DIAGNOSIS — E04.1 THYROID NODULE: ICD-10-CM

## 2021-04-13 DIAGNOSIS — C61 MALIGNANT NEOPLASM OF PROSTATE (HCC): ICD-10-CM

## 2021-04-13 DIAGNOSIS — F51.04 CHRONIC INSOMNIA: ICD-10-CM

## 2021-04-13 DIAGNOSIS — L72.3 SEBACEOUS CYST: Primary | ICD-10-CM

## 2021-04-13 DIAGNOSIS — E78.5 HYPERLIPIDEMIA, UNSPECIFIED HYPERLIPIDEMIA TYPE: ICD-10-CM

## 2021-04-13 DIAGNOSIS — K76.6 PORTAL VENOUS HYPERTENSION (HCC): ICD-10-CM

## 2021-04-13 DIAGNOSIS — D73.2 SPLENOMEGALY, CONGESTIVE, CHRONIC: ICD-10-CM

## 2021-04-13 DIAGNOSIS — B35.1 ONYCHOMYCOSIS: ICD-10-CM

## 2021-04-13 DIAGNOSIS — Z11.59 NEED FOR HEPATITIS C SCREENING TEST: ICD-10-CM

## 2021-04-13 DIAGNOSIS — I10 ESSENTIAL HYPERTENSION: ICD-10-CM

## 2021-04-13 PROCEDURE — G8752 SYS BP LESS 140: HCPCS | Performed by: INTERNAL MEDICINE

## 2021-04-13 PROCEDURE — 99204 OFFICE O/P NEW MOD 45 MIN: CPT | Performed by: INTERNAL MEDICINE

## 2021-04-13 PROCEDURE — G8510 SCR DEP NEG, NO PLAN REQD: HCPCS | Performed by: INTERNAL MEDICINE

## 2021-04-13 PROCEDURE — G8536 NO DOC ELDER MAL SCRN: HCPCS | Performed by: INTERNAL MEDICINE

## 2021-04-13 PROCEDURE — G8427 DOCREV CUR MEDS BY ELIG CLIN: HCPCS | Performed by: INTERNAL MEDICINE

## 2021-04-13 PROCEDURE — G8420 CALC BMI NORM PARAMETERS: HCPCS | Performed by: INTERNAL MEDICINE

## 2021-04-13 PROCEDURE — G8754 DIAS BP LESS 90: HCPCS | Performed by: INTERNAL MEDICINE

## 2021-04-13 PROCEDURE — 1101F PT FALLS ASSESS-DOCD LE1/YR: CPT | Performed by: INTERNAL MEDICINE

## 2021-04-13 PROCEDURE — 99406 BEHAV CHNG SMOKING 3-10 MIN: CPT | Performed by: INTERNAL MEDICINE

## 2021-04-13 PROCEDURE — 3017F COLORECTAL CA SCREEN DOC REV: CPT | Performed by: INTERNAL MEDICINE

## 2021-04-13 RX ORDER — VARENICLINE TARTRATE 25 MG
KIT ORAL
Qty: 1 DOSE PACK | Refills: 0 | Status: SHIPPED | OUTPATIENT
Start: 2021-04-13 | End: 2021-08-18 | Stop reason: ALTCHOICE

## 2021-04-13 RX ORDER — TRAZODONE HYDROCHLORIDE 50 MG/1
50 TABLET ORAL
Qty: 90 TAB | Refills: 1 | Status: SHIPPED | OUTPATIENT
Start: 2021-04-13 | End: 2021-10-10

## 2021-04-13 NOTE — PROGRESS NOTES
Chief Complaint   Patient presents with   [address]     NEW PT            Visit Vitals  /67 (BP 1 Location: Left upper arm, BP Patient Position: Sitting)   Pulse 60   Temp 97.8 °F (36.6 °C)   Resp 16   Ht 5' 9\" (1.753 m)   Wt 139 lb (63 kg)   SpO2 99%   BMI 20.53 kg/m²         1. Have you been to the ER, urgent care clinic since your last visit? Hospitalized since your last visit? NO     2. Have you seen or consulted any other health care providers outside of the [address] [place] [address] since your last visit? Include any pap smears or colon screening.  NO

## 2021-04-13 NOTE — PROGRESS NOTES
Collin Alfaro is a 76 y.o. male and presents with \[facility] [place] [facility]\"" Care (NEW PT )    Smoker of 1 pack per day for 46 years. HTN controlled   Incidental finding of splenomegaly and venous portal hypertension with no evidence of cirrhosis in ct scan from March 2020, reviewed in chart and results, no work up for this. This CT was done as part of malignancy work up for prostate cancer, he's asymptomatic and s/p robotic prostatectomy, negative margins, T2N0, doing well, reviewed  Most recent Urology notes. He denies any history of blood cell dyscrasias     Review of Systems  Review of Systems   Constitutional: Negative for chills, fatigue, fever and unexpected weight change. HENT: Negative for congestion, ear pain, sneezing and sore throat. Eyes: Negative for pain and discharge. Respiratory: Negative for cough, shortness of breath and wheezing. Cardiovascular: Negative for chest pain, palpitations and leg swelling. Gastrointestinal: Negative for abdominal pain, blood in stool, constipation and diarrhea. Endocrine: Negative for polydipsia and polyuria. Genitourinary: Negative for difficulty urinating, dysuria, frequency, hematuria and urgency. Musculoskeletal: Negative for arthralgias, back pain and joint swelling. Skin: Negative for rash. Allergic/Immunologic: Negative for environmental allergies and food allergies. Neurological: Negative for dizziness, speech difficulty, weakness, light-headedness, numbness and headaches. Hematological: Negative for adenopathy. Psychiatric/Behavioral: Negative for behavioral problems (Depression), sleep disturbance and suicidal ideas.           Past Medical History:   Diagnosis Date    Hypercholesteremia     Hypertension     Insomnia     Malignant neoplasm of prostate ([facility] [address] 7/29/2020    Microscopic hematuria 7/29/2020     Past Surgical History:   Procedure Laterality Date    HX PROSTATE SURGERY  01/11/2021    Limited pelvic lymph node dissection    HX PROSTATECTOMY  01/11/2021    Robotic prostatectomy    HX UROLOGICAL      cystourethroscopy BRP    HX WISDOM TEETH EXTRACTION      when he was little    LA CYSTOURETHROSCOPY      LA CYSTOURETHROSCOPY,URETER CATHETER       Social History     Socioeconomic History    Marital status: SINGLE     Spouse name: Not on file    Number of children: Not on file    Years of education: Not on file    Highest education level: Not on file   Tobacco Use    Smoking status: Current Every Day Smoker     Packs/day: 0.50     Years: 20.00     Pack years: 10.00    Smokeless tobacco: Never Used   Substance and Sexual Activity    Alcohol use: Not Currently    Drug use: Never   Other Topics Concern     Family History   Problem Relation Age of Onset    Colon Cancer Brother     Cancer Brother     Heart Disease Mother     Hypertension Mother     Cancer Father     Cancer Paternal Grandfather      Current Outpatient Medications   Medication Sig Dispense Refill    traZODone (DESYREL) 50 mg tablet Take 1 Tab by mouth nightly for 180 days. 90 Tab 1    varenicline (CHANTIX STARTER JUDSON) 0.5 mg (11)- 1 mg (42) DsPk Follow instructions in dose pack 1 Dose Pack 0    ergocalciferol (Vitamin D2) 1,250 mcg (50,000 unit) capsule Take 50,000 Units by mouth every seven (7) days.  simvastatin (ZOCOR) 40 mg tablet Take 40 mg by mouth nightly.  lisinopriL (PRINIVIL, ZESTRIL) 2.5 mg tablet Take 2.5 mg by mouth daily.  multivitamin (ONE A DAY) tablet Take 1 Tab by mouth daily. Allergies   Allergen Reactions    Aspirin Other (comments)     \"jittery\"       Objective:  Visit Vitals  /67 (BP 1 Location: Left upper arm, BP Patient Position: Sitting)   Pulse 60   Temp 97.8 °F (36.6 °C)   Resp 16   Ht 5' 9\" (1.753 m)   Wt 139 lb (63 kg)   SpO2 99%   BMI 20.53 kg/m²     Physical Exam:   Physical Exam  Constitutional:       General: He is not in acute distress. Appearance: Normal appearance.    HENT:      Head: Normocephalic and atraumatic. Mouth/Throat:      Mouth: Mucous membranes are moist.   Eyes:      Extraocular Movements: Extraocular movements intact. Conjunctiva/sclera: Conjunctivae normal.      Pupils: Pupils are equal, round, and reactive to light. Neck:      Musculoskeletal: Normal range of motion and neck supple. Thyroid: Thyroid mass (hard non mobile non tender nodule palpated on upper pole of left lobe  ) present. Cardiovascular:      Rate and Rhythm: Normal rate and regular rhythm. Pulses: Normal pulses. Dorsalis pedis pulses are 2+ on the right side and 2+ on the left side. Posterior tibial pulses are 2+ on the right side and 2+ on the left side. Heart sounds: Normal heart sounds. Pulmonary:      Effort: Pulmonary effort is normal.      Breath sounds: Normal breath sounds. Abdominal:      General: Abdomen is flat. Bowel sounds are normal. There is no distension. Palpations: Abdomen is soft. There is no mass. Tenderness: There is no abdominal tenderness. Musculoskeletal:         General: No swelling or deformity. Right lower leg: No edema. Left lower leg: No edema. Feet:      Right foot:      Toenail Condition: Right toenails are abnormally thick and long. Left foot:      Toenail Condition: Left toenails are abnormally thick and long. Lymphadenopathy:      Cervical: No cervical adenopathy. Skin:     General: Skin is warm and dry. Capillary Refill: Capillary refill takes less than 2 seconds. Coloration: Skin is not jaundiced or pale. Findings: Lesion (Soft and mobile took 15 minutes nodule, nontender with no skin changes in upper back) present. No erythema or rash. Neurological:      General: No focal deficit present. Mental Status: He is alert and oriented to person, place, and time. Psychiatric:         Mood and Affect: Mood normal.         Behavior: Behavior normal.         Thought Content:  Thought content normal.         Judgment: Judgment normal.          Results for orders placed or performed in visit on 02/23/21   CULTURE, URINE    Specimen: Urine   Result Value Ref Range    Urine Culture, Routine No growth    URINALYSIS W/MICROSCOPIC   Result Value Ref Range    Specific Gravity 1.029 1.005 - 1.030    pH (UA) 5.5 5.0 - 7.5    Color Yellow Yellow    Appearance Clear Clear    Leukocyte Esterase 1+ (A) Negative    Protein 1+ (A) Negative/Trace    Glucose Negative Negative    Ketone Negative Negative    Blood Negative Negative    Bilirubin Negative Negative    Urobilinogen 1.0 0.2 - 1.0 mg/dL    Nitrites Negative Negative    Microscopic Examination See additional order    MICROSCOPIC EXAMINATION   Result Value Ref Range    WBC 6-10 (A) 0 - 5 /hpf    RBC 0-2 0 - 2 /hpf    Epithelial cells 0-10 0 - 10 /hpf    Casts None seen None seen /lpf    Mucus Present Not Estab. Bacteria Few None seen/Few   AMB POC URINALYSIS DIP STICK AUTO W/O MICRO   Result Value Ref Range    Color (UA POC) Yellow     Clarity (UA POC) Clear     Glucose (UA POC) Negative Negative    Bilirubin (UA POC)      Ketones (UA POC) Negative Negative    Specific gravity (UA POC) 1.030 1.001 - 1.035    Blood (UA POC) Trace Negative    pH (UA POC) 6.0 4.6 - 8.0    Protein (UA POC) 3+ Negative    Urobilinogen (UA POC) 1 mg/dL 0.2 - 1    Nitrites (UA POC) Negative Negative    Leukocyte esterase (UA POC)         Assessment/Plan:    Mr. Wenceslao Silva as mentioned above had an incidental finding of splenomegaly in a CT scan done in March, along with signs of portal venous hypertension, without signs of any liver disease, we need to further work-up this as below. He is currently asymptomatic. Hypertension is well controlled, continue current treatment without changes. He is not in need of refills today.   Patient wants to try quitting smoking, spent 5 minutes in counseling discussed about possible methods, counseled, wants to try chantix, educated on needing to quit within 8 to 35 days of use, able to keep using for 12 more weeks after quitting if needed and combining with nicotine patches, educated about potential side effects including nausea and neuropsychiatric side effects including suicidal thoughts, abnormal behavior irritability, hallucinations, vivid dreams, nightmares, sleep walking and instructed to stop medication and call me if they appear. Rest of plan outlined      ICD-10-CM ICD-9-CM    1. Sebaceous cyst  L72.3 706.2 REFERRAL TO DERMATOLOGY   2. Overgrown toenails  L60.2 703.8 REFERRAL TO PODIATRY   3. Onychomycosis  B35.1 110.1 REFERRAL TO PODIATRY   4. Essential hypertension  I10 401.9    5. Cigarette nicotine dependence with other nicotine-induced disorder  F17.218 292.89 varenicline (CHANTIX STARTER JUDSON) 0.5 mg (11)- 1 mg (42) DsPk      CT LOW DOSE LUNG CANCER SCREENING      RI SMOKING AND TOBACCO USE CESSATION 3 - 10 MINUTES   6. Malignant neoplasm of prostate ([facility] [address]  C61 185    7. Splenomegaly, congestive, chronic  D73.2 289.51 CBC WITH AUTOMATED DIFF      METABOLIC PANEL, COMPREHENSIVE      US ABD COMP      DUPLEX ABD VISC ART/RICK/ORGANS COMPLETE      PATHOLOGIST REVIEW SMEARS      HIV 1/2 AG/AB, 4TH GENERATION,W RFLX CONFIRM   8. Portal venous hypertension (HCC)  K76.6 572.3 US ABD COMP      DUPLEX ABD VISC ART/RICK/ORGANS COMPLETE   9. Need for hepatitis C screening test  Z11.59 V73.89 HEPATITIS C AB   10. Chronic insomnia  F51.04 780.52 traZODone (DESYREL) 50 mg tablet   11.  Thyroid nodule  E04.1 241.0 T4, FREE      TSH 3RD GENERATION      US THYROID/PARATHYROID/SOFT TISS   12. Hyperlipidemia, unspecified hyperlipidemia type  E78.5 272.4 LIPID PANEL     Orders Placed This Encounter    US ABD COMP     Standing Status:   Future     Standing Expiration Date:   5/13/2022    US THYROID/PARATHYROID/SOFT TISS     Standing Status:   Future     Standing Expiration Date:   5/13/2022    CT LOW DOSE LUNG CANCER SCREENING     Standing Status: Future     Standing Expiration Date:   5/13/2022     Order Specific Question:   Is this a low dose CT or a routine CT? Answer:   Low Dose CT [1]     Order Specific Question:   Reason for exam     Answer:   Lung Cancer Screening     Order Specific Question:   Smoking Status   **NOTE: \"Never Smoker\" does not meet the CMS Guidelines for Lung Cancer Screening CT-study may not be covered by insurance     Answer:   Current Every Day Smoker [1]     Order Specific Question:   Smoking history; number of pack-years     Answer:   55     Order Specific Question:   The patient age is 50-69 years. **NOTE: If \"NO\" this study may not be covered by insurance. Answer:   Yes     Order Specific Question:   Does the patient show any signs or symptoms of lung cancer? Answer:   No     Order Specific Question:   Is this the first (baseline) CT or an annual exam?     Answer:   Baseline [1]     Order Specific Question:   Is there documentation of shared decision making? Answer:   Yes     Order Specific Question:   The patient was informed of the importance of adherence to annual screening, impact of comorbidities and ability/willingness to undergo diagnosis and treatment     Answer:   Yes     Order Specific Question:   The patient was informed of the importance of smoking cessation and/or maintaining smoking abstinence, including the offer of Medicare-covered tobacco cessation counseling services, if applicable     Answer:    Yes    CBC WITH AUTOMATED DIFF    METABOLIC PANEL, COMPREHENSIVE    LIPID PANEL    T4, FREE    TSH 3RD GENERATION    HEPATITIS C AB    HIV 1/2 AG/AB, 4TH GENERATION,W RFLX CONFIRM    [facility] ref [facility]     Referral Priority:   Routine     Referral Type:   Consultation     Referral Reason:   Specialty Services Required     Referred to Provider:   Marlen Zepeda DPM     Number of Visits Requested:   1    REFERRAL TO DERMATOLOGY     Referral Priority:   Routine     Referral Type: Consultation     Referral Reason:   Specialty Services Required     Referred to Provider:   Juan White DO     Number of Visits Requested:   1    [place] SMOKING AND TOBACCO USE CESSATION 3 - 10 MINUTES    DUPLEX ABD VISC ART/RICK/ORGANS COMPLETE     Splenic, mesenteric, portal     Standing Status:   Future     Standing Expiration Date:   10/13/2021     Order Specific Question:   Where do you want this procedure performed? Answer:   Vascular Lab     Order Specific Question:   Area of Interest:     Answer: Other (enter in Comments)    traZODone (DESYREL) 50 mg tablet     Sig: Take 1 Tab by mouth nightly for 180 days. Dispense:  90 Tab     Refill:  1    varenicline (CHANTIX STARTER JUDSON) 0.5 mg (11)- 1 mg (42) DsPk     Sig: Follow instructions in dose pack     Dispense:  1 Dose Pack     Refill:  0    PATHOLOGIST REVIEW SMEARS ([facility] Default)     stop smoking (advice and handout given), follow low salt diet, routine labs ordered, call if any problems  Patient Instructions   Confirmation code for covid vaccine registration:   REFERENCE CODE: [id number]     Follow-up and Dispositions    · Return in about 2 months (around 6/13/2021) for splenomegaly, smoking, hypertension, thyroid nodule.

## 2021-04-27 ENCOUNTER — HOSPITAL ENCOUNTER (OUTPATIENT)
Dept: NON INVASIVE DIAGNOSTICS | Age: 66
End: 2021-04-27
Attending: INTERNAL MEDICINE
Payer: MEDICARE

## 2021-04-27 ENCOUNTER — HOSPITAL ENCOUNTER (OUTPATIENT)
Dept: ULTRASOUND IMAGING | Age: 66
Discharge: HOME | End: 2021-04-27
Attending: INTERNAL MEDICINE
Payer: MEDICARE

## 2021-04-27 DIAGNOSIS — E04.1 THYROID NODULE: ICD-10-CM

## 2021-04-27 DIAGNOSIS — K76.6 PORTAL VENOUS HYPERTENSION (HCC): ICD-10-CM

## 2021-04-27 DIAGNOSIS — D73.2 SPLENOMEGALY, CONGESTIVE, CHRONIC: ICD-10-CM

## 2021-04-27 PROCEDURE — 76700 US EXAM ABDOM COMPLETE: CPT

## 2021-04-27 PROCEDURE — 76536 US EXAM OF HEAD AND NECK: CPT

## 2021-04-28 NOTE — PROGRESS NOTES
Please let him know the nodule he has in his thyroid is very small and of benign appearance, and no need to do anything else about it. Thanks.

## 2021-04-29 ENCOUNTER — HOSPITAL ENCOUNTER (OUTPATIENT)
Dept: CT IMAGING | Age: 66
Discharge: HOME | End: 2021-04-29
Attending: INTERNAL MEDICINE
Payer: MEDICARE

## 2021-04-29 VITALS — HEIGHT: 69 IN | WEIGHT: 165 LBS | BODY MASS INDEX: 24.44 KG/M2

## 2021-04-29 DIAGNOSIS — F17.218 CIGARETTE NICOTINE DEPENDENCE WITH OTHER NICOTINE-INDUCED DISORDER: ICD-10-CM

## 2021-04-29 PROCEDURE — 71271 CT THORAX LUNG CANCER SCR C-: CPT

## 2021-05-07 NOTE — PROGRESS NOTES
CT chest shows 2 nodules less than 8 mm, benign appearance, we need to repeat CT yearly to check on change in size. He has emphysema, please encourage him as we discussed to consider smoking cessation.  Thanks

## 2021-05-10 ENCOUNTER — TELEPHONE (OUTPATIENT)
Dept: INTERNAL MEDICINE CLINIC | Age: 66
End: 2021-05-10

## 2021-05-20 ENCOUNTER — TELEPHONE (OUTPATIENT)
Dept: UROLOGY | Age: 66
End: 2021-05-20

## 2021-05-21 LAB — PSA SERPL-MCNC: <0.1 NG/ML (ref 0–4)

## 2021-05-23 DIAGNOSIS — C61 MALIGNANT NEOPLASM OF PROSTATE (HCC): ICD-10-CM

## 2021-05-25 ENCOUNTER — OFFICE VISIT (OUTPATIENT)
Dept: UROLOGY | Age: 66
End: 2021-05-25
Payer: MEDICARE

## 2021-05-25 VITALS
BODY MASS INDEX: 24.44 KG/M2 | RESPIRATION RATE: 12 BRPM | WEIGHT: 165 LBS | OXYGEN SATURATION: 100 % | DIASTOLIC BLOOD PRESSURE: 60 MMHG | HEIGHT: 69 IN | TEMPERATURE: 96.9 F | HEART RATE: 62 BPM | SYSTOLIC BLOOD PRESSURE: 108 MMHG

## 2021-05-25 DIAGNOSIS — C61 MALIGNANT NEOPLASM OF PROSTATE (HCC): Primary | ICD-10-CM

## 2021-05-25 DIAGNOSIS — N52.9 IMPOTENCE: ICD-10-CM

## 2021-05-25 DIAGNOSIS — R32 URINARY INCONTINENCE, UNSPECIFIED TYPE: ICD-10-CM

## 2021-05-25 LAB
BILIRUB UR QL STRIP: NORMAL
GLUCOSE UR-MCNC: NEGATIVE MG/DL
KETONES P FAST UR STRIP-MCNC: NEGATIVE MG/DL
PH UR STRIP: 5.5 [PH] (ref 4.6–8)
PROT UR QL STRIP: NORMAL
SP GR UR STRIP: 1.03 (ref 1–1.03)
UA UROBILINOGEN AMB POC: NORMAL (ref 0.2–1)
URINALYSIS CLARITY POC: CLEAR
URINALYSIS COLOR POC: YELLOW
URINE BLOOD POC: NEGATIVE
URINE LEUKOCYTES POC: NEGATIVE
URINE NITRITES POC: NEGATIVE

## 2021-05-25 PROCEDURE — 99214 OFFICE O/P EST MOD 30 MIN: CPT | Performed by: UROLOGY

## 2021-05-25 PROCEDURE — 81003 URINALYSIS AUTO W/O SCOPE: CPT | Performed by: UROLOGY

## 2021-05-25 NOTE — ASSESSMENT & PLAN NOTE
He is advised to try a vacuum erection device. He is recommended to use a medical grade device from [employer], [employer] or other.  www. [employer]

## 2021-05-25 NOTE — ASSESSMENT & PLAN NOTE
He is s/p prostatectomy on 1/11/2021. Joby's 4+3 disease, negative margins/nodes. PSA 5/20/21 was undetectable. Plan for repeat value and f/u after in 3 months.

## 2021-05-25 NOTE — PATIENT INSTRUCTIONS
Treatment For ED Find the solution thats right for you. There are a number of treatments for Erectile Dysfunction, Vacuum Therapy is 90% effective. Let us show you why our devices are the best treatment for ED. https://[employer].Simple Vacuum Therapy  Vacuum Therapy has been widely recognized and recommended by the medical profession as a safe and effective system for the management of Erectile Dysfunction (ED).  Evidence shows that [address] are a less expensive and healthier alternative to many of the other options available. This form of treatment for ED is FDA Approved for Marketing.  Vacuum Therapy is a safe, effective and reliable non-surgical means of helping a man produce erections suitable for sexual intercourse in a controllable and immediate fashion. It does not require medication, injections, or surgery and is effective in treating ED due to many different causes.  Vacuum Therapy Treatment is a popular solution to the problem of ED. An erection is achieved by first, placing the penis in a vacuum cylinder, then creating a vacuum, which generates blood flow into the penis which causes rigidity. The erection is held by tension rings, which restricts the flow of blood back out of the penis.  Using this system, an erection can be safely maintained for up to 30 minutes at a time. The devices can be used as often as the client desires and there is no danger of creating too much vacuum due to the release valve in the system.

## 2021-05-25 NOTE — PROGRESS NOTES
HISTORY OF PRESENT ILLNESS  Dejuan Ignacio is a 78 y.o. male. Chief Complaint   Patient presents with    Follow-up    Prostate Cancer    Incontinence     He is s/p robotic prostatectomy, Limited pelvic lymph node dissection on 1/11/21. Pathology significant for Joby score 4+3=7, negative margins, negative nodes. PT2N0. PSA 5/20/21: undetectable. He reports good urine control. He is not wearing protective undergarments. He has not yet attempted sexual intercourse. He reports no tumescence. Chronic Conditions Addressed Today     1. Malignant neoplasm of prostate [facility]) - Primary     Overview      He had a prostate biopsy by Dr. Daisy Brown on 2/12/2020. PSA October 2019 was 6.4. There was a single focus of Joby's grade 3+3 at the left apex. CT on 3/13/2020 revealed splenomegaly and signs of portal venous hypertension. There is no evidence of metastatic disease. Bone scan 3/13/2020 revealed no metastatic disease. The patient had an MRI of the pelvis on 8/21/2020. There were no concerning findings. He is s/p robotic prostatectomy, Limited pelvic lymph node dissection on 1/11/21. Pathology significant for Anabel score 4+3=7, negative margins, negative nodes. PT2N0. PSA 5/20/21: undetectable. 2. Urinary incontinence     Overview      He is s/p robotic prostatectomy on 1/11/21.   2/23/21: good urine control, no leaking and not using protective undergarments. 3. Impotence     Overview      He is s/p prostatectomy on 1/11/21. He has partial erections ~2 months. He had not attempted intercourse. Patient denies the symptoms of COVID-19 per routine screening guidelines. Review of Systems   All other systems reviewed and are negative. Past Medical History:  PMHx (including negatives):  has a past medical history of Hypercholesteremia, Hypertension, Insomnia, Malignant neoplasm of prostate ([address] (7/29/2020), and Microscopic hematuria (7/29/2020). PSurgHx:  has a past surgical history that includes pr cystourethroscopy,ureter catheter; pr cystourethroscopy; hx wisdom teeth extraction; hx urological; hx prostatectomy (01/11/2021); and hx prostate surgery (01/11/2021). PSocHx:  reports that he has been smoking. He started smoking about 46 years ago. He has a 20.00 pack-year smoking history. He has never used smokeless tobacco. He reports previous alcohol use. He reports that he does not use drugs. Physical Exam    ASSESSMENT and PLAN  Diagnoses and all orders for this visit:    1. Malignant neoplasm of prostate [facility])  Assessment & Plan:  He is s/p prostatectomy on 1/11/2021. Austin's 4+3 disease, negative margins/nodes. PSA 5/20/21 was undetectable. Plan for repeat value and f/u after in 3 months. Orders:  -     PSA, DIAGNOSTIC (PROSTATE SPECIFIC AG); Future  -     AMB POC URINALYSIS DIP STICK AUTO W/O MICRO    2. Impotence  Assessment & Plan:  He is advised to try a vacuum erection device. He is recommended to use a medical grade device from [employer], [employer] or other.  www. [employer]    Orders:  -     AMB POC URINALYSIS DIP STICK AUTO W/O MICRO    3. Urinary incontinence, unspecified type  Assessment & Plan:  Stable. He has no problems with leakage. He is not wearing protective undergarments. Follow-up and Dispositions    · Return in about 6 months (around 11/25/2021) for PSA at visit.          Fatoumata Pnieda MD

## 2021-05-25 NOTE — PROGRESS NOTES
Chief Complaint   Patient presents with    Follow-up    Prostate Cancer    Incontinence       1. Have you been to the ER, urgent care clinic since your last visit? Hospitalized since your last visit? No    2. Have you seen or consulted any other health care providers outside of the [address] [place] [address] since your last visit? Include any pap smears or colon screening.  No    Visit Vitals  /60 (BP 1 Location: Left upper arm, BP Patient Position: Sitting, BP Cuff Size: Adult)   Pulse 62   Temp 96.9 °F (36.1 °C) (Temporal)   Resp 12   Ht 5' 9\" (1.753 m)   Wt 165 lb (74.8 kg)   SpO2 100%   BMI 24.37 kg/m²

## 2021-05-26 ENCOUNTER — OFFICE VISIT (OUTPATIENT)
Dept: PODIATRY | Age: 66
End: 2021-05-26
Payer: MEDICARE

## 2021-05-26 VITALS
BODY MASS INDEX: 20.44 KG/M2 | TEMPERATURE: 96.4 F | WEIGHT: 138 LBS | SYSTOLIC BLOOD PRESSURE: 122 MMHG | HEART RATE: 64 BPM | DIASTOLIC BLOOD PRESSURE: 68 MMHG | HEIGHT: 69 IN | OXYGEN SATURATION: 98 %

## 2021-05-26 DIAGNOSIS — B35.1 ONYCHOMYCOSIS: Primary | ICD-10-CM

## 2021-05-26 PROCEDURE — 11755 BIOPSY NAIL UNIT: CPT | Performed by: PODIATRIST

## 2021-05-26 PROCEDURE — 3017F COLORECTAL CA SCREEN DOC REV: CPT | Performed by: PODIATRIST

## 2021-05-26 PROCEDURE — 1101F PT FALLS ASSESS-DOCD LE1/YR: CPT | Performed by: PODIATRIST

## 2021-05-26 PROCEDURE — G8536 NO DOC ELDER MAL SCRN: HCPCS | Performed by: PODIATRIST

## 2021-05-26 PROCEDURE — G8752 SYS BP LESS 140: HCPCS | Performed by: PODIATRIST

## 2021-05-26 PROCEDURE — G8754 DIAS BP LESS 90: HCPCS | Performed by: PODIATRIST

## 2021-05-26 PROCEDURE — G8420 CALC BMI NORM PARAMETERS: HCPCS | Performed by: PODIATRIST

## 2021-05-26 PROCEDURE — G8427 DOCREV CUR MEDS BY ELIG CLIN: HCPCS | Performed by: PODIATRIST

## 2021-05-26 PROCEDURE — G8432 DEP SCR NOT DOC, RNG: HCPCS | Performed by: PODIATRIST

## 2021-05-26 PROCEDURE — 99203 OFFICE O/P NEW LOW 30 MIN: CPT | Performed by: PODIATRIST

## 2021-05-26 NOTE — PROGRESS NOTES
.  Chief Complaint   Patient presents with    Foot Exam     pt states he needs toenails clipped     1. Have you been to the ER, urgent care clinic since your last visit? Hospitalized since your last visit? No    2. Have you seen or consulted any other health care providers outside of the [address] [place] [address] since your last visit? Include any pap smears or colon screening.  No  PCP-Dr Jim Cunningham

## 2021-05-31 NOTE — PROGRESS NOTES
[facility]    Subjective:         Patient is a 78 y.o. male who is being seen as a new pt for a possible toenail plate infx. He states he has suffer witht the thick/discolored toenails for many years. He has tried many OTC medications without relief of her symptoms. He denies any trauma to the nails. He denies any overt pain. He denies any systemic signs of infx. He denies any other LE complaints    Past Medical History:   Diagnosis Date    Hypercholesteremia     Hypertension     Insomnia     Malignant neoplasm of prostate ([address] 7/29/2020    Microscopic hematuria 7/29/2020     Past Surgical History:   Procedure Laterality Date    HX PROSTATE SURGERY  01/11/2021    Limited pelvic lymph node dissection    HX PROSTATECTOMY  01/11/2021    Robotic prostatectomy    HX UROLOGICAL      cystourethroscopy BRP    HX WISDOM TEETH EXTRACTION      when he was little    [place] CYSTOURETHROSCOPY      [place] CYSTOURETHROSCOPY,URETER CATHETER         Family History   Problem Relation Age of Onset    Colon Cancer Brother     Cancer Brother     Heart Disease Mother     Hypertension Mother     Cancer Father     Cancer Paternal Grandfather       Social History     Tobacco Use    Smoking status: Current Every Day Smoker     Packs/day: 1.00     Years: 20.00     Pack years: 20.00     Start date: 4/29/1975    Smokeless tobacco: Never Used   Substance Use Topics    Alcohol use: Not Currently     Allergies   Allergen Reactions    Aspirin Other (comments)     \"jittery\"     Prior to Admission medications    Medication Sig Start Date End Date Taking? Authorizing Provider   traZODone (DESYREL) 50 mg tablet Take 1 Tab by mouth nightly for 180 days.  4/13/21 10/10/21  Alex Cat MD   varenicline (CHANTIX STARTER JUDSON) 0.5 mg (11)- 1 mg (42) DsPk Follow instructions in dose pack 4/13/21   Jemima East MD   ergocalciferol (Vitamin D2) 1,250 mcg (50,000 unit) capsule Take 50,000 Units by mouth every seven (7) days. Provider, Historical   simvastatin (ZOCOR) 40 mg tablet Take 40 mg by mouth nightly. Provider, Historical   lisinopriL (PRINIVIL, ZESTRIL) 2.5 mg tablet Take 2.5 mg by mouth daily. Provider, Historical   multivitamin (ONE A DAY) tablet Take 1 Tab by mouth daily. Provider, Historical       Review of Systems   Constitutional: Negative. HENT: Negative. Eyes: Negative. Respiratory: Negative. Cardiovascular: Negative. Gastrointestinal: Negative. Endocrine: Negative. Genitourinary: Negative. Musculoskeletal: Negative. Skin: Negative. Allergic/Immunologic: Negative. Neurological: Positive for syncope. Hematological: Negative. Psychiatric/Behavioral: Negative. All other systems reviewed and are negative. Objective:     Visit Vitals  /68 (BP 1 Location: Right upper arm, BP Patient Position: Sitting, BP Cuff Size: Small adult)   Pulse 64   Temp (!) 96.4 °F (35.8 °C) (Temporal)   Ht 5' 9\" (1.753 m)   Wt 138 lb (62.6 kg)   SpO2 98%   BMI 20.38 kg/m²       Physical Exam  Vitals reviewed. Constitutional:       Appearance: He is normal weight. Cardiovascular:      Pulses:           Dorsalis pedis pulses are 2+ on the right side and 2+ on the left side. Posterior tibial pulses are 2+ on the right side and 2+ on the left side. Pulmonary:      Effort: Pulmonary effort is normal.   Musculoskeletal:      Right lower leg: No edema. Left lower leg: No edema. Right foot: Normal range of motion. No deformity or bunion. Left foot: Normal range of motion. No deformity or bunion. Feet:      Right foot:      Protective Sensation: 10 sites tested. 10 sites sensed. Skin integrity: Skin integrity normal.      Toenail Condition: Right toenails are abnormally thick. Fungal disease present. Left foot:      Protective Sensation: 10 sites tested. 10 sites sensed.       Skin integrity: Skin integrity normal. Toenail Condition: Left toenails are abnormally thick. Fungal disease present. Lymphadenopathy:      Lower Body: No right inguinal adenopathy. No left inguinal adenopathy. Skin:     General: Skin is warm. Capillary Refill: Capillary refill takes 2 to 3 seconds. Neurological:      Mental Status: He is alert and oriented to person, place, and time. Psychiatric:         Mood and Affect: Mood and affect normal.         Behavior: Behavior is cooperative. Data Review: No results found for this or any previous visit (from the past 24 hour(s)). Impression:       ICD-10-CM ICD-9-CM    1. Onychomycosis  B35.1 110.1 BIOPSY, NAIL UNIT         Recommendation:     Patient seen and evaluated in the office  Discussed and educated patient regarding their current medical condition. Instructed patient to monitor their feet daily, be compliant with all medications and wear supportive shoe gear. It was determined that a nail plate biopsy would be taken of the right hallux to confirm the presence of fungal elements. This was performed with a nail nipper. Pt tolerated well and no dressing was needed.  Will discuss tx options when pathology has returnfed

## 2021-06-24 DIAGNOSIS — B35.1 ONYCHOMYCOSIS: Primary | ICD-10-CM

## 2021-06-29 DIAGNOSIS — B35.1 ONYCHOMYCOSIS: Primary | ICD-10-CM

## 2021-06-29 LAB
ALBUMIN SERPL-MCNC: 5 G/DL (ref 3.8–4.8)
ALP SERPL-CCNC: 119 IU/L (ref 48–121)
ALT SERPL-CCNC: 7 IU/L (ref 0–44)
AST SERPL-CCNC: 11 IU/L (ref 0–40)
BILIRUB DIRECT SERPL-MCNC: 0.12 MG/DL (ref 0–0.4)
BILIRUB SERPL-MCNC: 0.5 MG/DL (ref 0–1.2)
PROT SERPL-MCNC: 8 G/DL (ref 6–8.5)

## 2021-06-29 NOTE — PROGRESS NOTES
LFTs are normal.  Please prescribe the oral terbinafine 2 and 50 mg daily for 90 days and I will sign.   Thank you

## 2021-07-01 RX ORDER — TERBINAFINE HYDROCHLORIDE 250 MG/1
250 TABLET ORAL DAILY
Qty: 90 TABLET | Refills: 0 | Status: SHIPPED | OUTPATIENT
Start: 2021-07-01

## 2021-08-18 ENCOUNTER — OFFICE VISIT (OUTPATIENT)
Dept: INTERNAL MEDICINE CLINIC | Age: 66
End: 2021-08-18
Payer: MEDICARE

## 2021-08-18 VITALS
TEMPERATURE: 97.2 F | OXYGEN SATURATION: 100 % | DIASTOLIC BLOOD PRESSURE: 60 MMHG | WEIGHT: 133.2 LBS | SYSTOLIC BLOOD PRESSURE: 115 MMHG | BODY MASS INDEX: 19.73 KG/M2 | HEIGHT: 69 IN | HEART RATE: 71 BPM

## 2021-08-18 DIAGNOSIS — I10 ESSENTIAL HYPERTENSION: ICD-10-CM

## 2021-08-18 DIAGNOSIS — F17.218 CIGARETTE NICOTINE DEPENDENCE WITH OTHER NICOTINE-INDUCED DISORDER: Primary | ICD-10-CM

## 2021-08-18 DIAGNOSIS — Z23 ENCOUNTER FOR IMMUNIZATION: ICD-10-CM

## 2021-08-18 DIAGNOSIS — L72.3 SEBACEOUS CYST: ICD-10-CM

## 2021-08-18 PROCEDURE — 1101F PT FALLS ASSESS-DOCD LE1/YR: CPT | Performed by: INTERNAL MEDICINE

## 2021-08-18 PROCEDURE — G8754 DIAS BP LESS 90: HCPCS | Performed by: INTERNAL MEDICINE

## 2021-08-18 PROCEDURE — G8510 SCR DEP NEG, NO PLAN REQD: HCPCS | Performed by: INTERNAL MEDICINE

## 2021-08-18 PROCEDURE — 90732 PPSV23 VACC 2 YRS+ SUBQ/IM: CPT | Performed by: INTERNAL MEDICINE

## 2021-08-18 PROCEDURE — G8427 DOCREV CUR MEDS BY ELIG CLIN: HCPCS | Performed by: INTERNAL MEDICINE

## 2021-08-18 PROCEDURE — G0009 ADMIN PNEUMOCOCCAL VACCINE: HCPCS | Performed by: INTERNAL MEDICINE

## 2021-08-18 PROCEDURE — G8420 CALC BMI NORM PARAMETERS: HCPCS | Performed by: INTERNAL MEDICINE

## 2021-08-18 PROCEDURE — G8752 SYS BP LESS 140: HCPCS | Performed by: INTERNAL MEDICINE

## 2021-08-18 PROCEDURE — 99214 OFFICE O/P EST MOD 30 MIN: CPT | Performed by: INTERNAL MEDICINE

## 2021-08-18 PROCEDURE — G8536 NO DOC ELDER MAL SCRN: HCPCS | Performed by: INTERNAL MEDICINE

## 2021-08-18 PROCEDURE — 3017F COLORECTAL CA SCREEN DOC REV: CPT | Performed by: INTERNAL MEDICINE

## 2021-08-18 RX ORDER — ZOSTER VACCINE RECOMBINANT, ADJUVANTED 50 MCG/0.5
0.5 KIT INTRAMUSCULAR ONCE
Qty: 0.5 ML | Refills: 0 | Status: SHIPPED | OUTPATIENT
Start: 2021-08-18 | End: 2021-08-18

## 2021-08-18 RX ORDER — VARENICLINE TARTRATE 1 MG/1
1 TABLET, FILM COATED ORAL 2 TIMES DAILY
Qty: 120 TABLET | Refills: 0 | Status: SHIPPED | OUTPATIENT
Start: 2021-08-18 | End: 2021-10-17

## 2021-08-18 NOTE — PROGRESS NOTES
1. Have you been to the ER, urgent care clinic since your last visit? Hospitalized since your last visit? No    2. Have you seen or consulted any other health care providers outside of the [address] [place] [address] since your last visit? Include any pap smears or colon screening.  No     Chief Complaint   Patient presents with    Follow-up    Prostate Cancer    Enuresis    Hypertension    Insomnia    Cholesterol Problem     Pt states that he needs to f/u

## 2021-08-18 NOTE — PROGRESS NOTES
Donna Grant is a 78 y.o. male and presents with Follow-up, Prostate Cancer, Enuresis, Hypertension, Insomnia, and Cholesterol Problem    MrGeronimo Ferreira started chantix close to a month ago, currently smoking half a pack a day. low dose CT emphysema. 8 mm nodule, to have another one in 1 year  No CP, no sob, no cough, no wheezing. HTn is well controlled   Continues trazodoen as neded for insomnia which helps. He says he did colonoscopy at [address] [place] [address], does not know nname of gasatroenterologist, but [facility], he was told f/u in 10 years. Dr[facility] Valeriano Alfaro at [facility]        Review of Systems  Review of Systems   Constitutional: Negative for chills, fatigue, fever and unexpected weight change. HENT: Negative for congestion, ear pain, sneezing and sore throat. Eyes: Negative for pain and discharge. Respiratory: Negative for cough, shortness of breath and wheezing. Cardiovascular: Negative for chest pain, palpitations and leg swelling. Gastrointestinal: Negative for abdominal pain, blood in stool, constipation and diarrhea. Endocrine: Negative for polydipsia and polyuria. Genitourinary: Negative for difficulty urinating, dysuria, frequency, hematuria and urgency. Musculoskeletal: Negative for arthralgias, back pain and joint swelling. Skin: Negative for rash. Allergic/Immunologic: Negative for environmental allergies and food allergies. Neurological: Negative for dizziness, speech difficulty, weakness, light-headedness, numbness and headaches. Hematological: Negative for adenopathy. Psychiatric/Behavioral: Negative for behavioral problems (Depression), sleep disturbance and suicidal ideas.           Past Medical History:   Diagnosis Date    Hypercholesteremia     Hypertension     Insomnia     Malignant neoplasm of [address] ([facility] [address] 7/29/2020    Microscopic hematuria 7/29/2020     Past Surgical History:   Procedure Laterality Date    HX PROSTATE SURGERY 01/11/2021    Limited pelvic lymph node dissection    HX PROSTATECTOMY  01/11/2021    Robotic prostatectomy    HX UROLOGICAL      cystourethroscopy BRP    HX WISDOM TEETH EXTRACTION      when he was little    [place] CYSTOURETHROSCOPY      [place] CYSTOURETHROSCOPY,URETER CATHETER       Social History     Socioeconomic History    Marital status: SINGLE     Spouse name: Not on file    Number of children: Not on file    Years of education: Not on file    Highest education level: Not on file   Tobacco Use    Smoking status: Current Every Day Smoker     Packs/day: 1.00     Years: 20.00     Pack years: 20.00     Start date: 4/29/1975    Smokeless tobacco: Never Used   Vaping Use    Vaping Use: Never used   Substance and Sexual Activity    Alcohol use: Not Currently    Drug use: Never    Sexual activity: Not Currently     Partners: Female   Other Topics Concern     Social Determinants of Health     Financial Resource Strain:     Difficulty of Paying Living Expenses:    Food Insecurity:     Worried About Running Out of Food in the Last Year:     Ran Out of Food in the Last Year:    Transportation Needs:     Lack of Transportation (Medical):      Lack of Transportation (Non-Medical):    Physical Activity:     Days of Exercise per Week:     Minutes of Exercise per Session:    Stress:     Feeling of Stress :    Social Connections:     Frequency of Communication with Friends and Family:     Frequency of Social Gatherings with Friends and Family:     Attends [employer] Services:     Active Member of Clubs or Organizations:     Attends Club or Organization Meetings:     Marital Status:      Family History   Problem Relation Age of Onset    Colon Cancer Brother     Cancer Brother     Heart Disease Mother     Hypertension Mother     Cancer Father     Cancer Paternal Grandfather      Current Outpatient Medications   Medication Sig Dispense Refill    varicella-zoster recombinant, PF, (Shingrix, PF,) 50 mcg/0.5 mL susr injection 0.5 mL by IntraMUSCular route once for 1 dose. 0.5 mL 0    varenicline (CHANTIX) 1 mg tablet Take 1 Tablet by mouth two (2) times a day for 60 days. 120 Tablet 0    terbinafine HCL (LAMISIL) 250 mg tablet Take 1 Tablet by mouth daily. 90 Tablet 0    traZODone (DESYREL) 50 mg tablet Take 1 Tab by mouth nightly for 180 days. 90 Tab 1    ergocalciferol (Vitamin D2) 1,250 mcg (50,000 unit) capsule Take 50,000 Units by mouth every seven (7) days.  simvastatin (ZOCOR) 40 mg tablet Take 40 mg by mouth nightly.  lisinopriL (PRINIVIL, ZESTRIL) 2.5 mg tablet Take 2.5 mg by mouth daily.  multivitamin (ONE A DAY) tablet Take 1 Tab by mouth daily. Allergies   Allergen Reactions    Aspirin Other (comments)     \"jittery\"       Objective:  Visit Vitals  /60 (BP 1 Location: Left upper arm, BP Patient Position: Sitting, BP Cuff Size: Adult)   Pulse 71   Temp 97.2 °F (36.2 °C) (Oral)   Ht 5' 9\" (1.753 m)   Wt 133 lb 3.2 oz (60.4 kg)   SpO2 100% Comment: RA   BMI 19.67 kg/m²     Physical Exam:   Physical Exam  Constitutional:       General: He is not in acute distress. Appearance: Normal appearance. HENT:      Head: Normocephalic and atraumatic. Mouth/Throat:      Mouth: Mucous membranes are moist.   Eyes:      Extraocular Movements: Extraocular movements intact. Conjunctiva/sclera: Conjunctivae normal.      Pupils: Pupils are equal, round, and reactive to light. Cardiovascular:      Rate and Rhythm: Normal rate and regular rhythm. Pulses: Normal pulses. Heart sounds: Normal heart sounds. Pulmonary:      Effort: Pulmonary effort is normal.      Breath sounds: Normal breath sounds. Abdominal:      General: Abdomen is flat. Bowel sounds are normal. There is no distension. Palpations: Abdomen is soft. There is no mass. Tenderness: There is no abdominal tenderness. Musculoskeletal:         General: No swelling or deformity.       Cervical back: Normal range of motion and neck supple. Right lower leg: No edema. Left lower leg: No edema. Lymphadenopathy:      Cervical: No cervical adenopathy. Skin:     General: Skin is warm and dry. Capillary Refill: Capillary refill takes less than 2 seconds. Coloration: Skin is not jaundiced or pale. Findings: No erythema or rash. Comments: Round well delimited subcutaneous lesion mobile of around 2 cm in the right side of the upper back, nontender, no skin changes   Neurological:      General: No focal deficit present. Mental Status: He is alert and oriented to person, place, and time. Psychiatric:         Mood and Affect: Mood normal.         Behavior: Behavior normal.         Thought Content: Thought content normal.         Judgment: Judgment normal.          Results for orders placed or performed in visit on 06/24/21   HEPATIC FUNCTION PANEL   Result Value Ref Range    Protein, total 8.0 6.0 - 8.5 g/dL    Albumin 5.0 (H) 3.8 - 4.8 g/dL    Bilirubin, total 0.5 0.0 - 1.2 mg/dL    Bilirubin, direct 0.12 0.00 - 0.40 mg/dL    Alk. phosphatase 119 48 - 121 IU/L    AST (SGOT) 11 0 - 40 IU/L    ALT (SGPT) 7 0 - 44 IU/L       Assessment/Plan:    ICD-10-CM ICD-9-CM    1. Cigarette nicotine dependence with other nicotine-induced disorder  F17.218 292.89 US EXAM SCREENING AAA      varenicline (CHANTIX) 1 mg tablet   2.  Encounter for immunization  Z23 V03.89 varicella-zoster recombinant, PF, (Shingrix, PF,) 50 mcg/0.5 mL susr injection      PNEUMOCOCCAL POLYSACCHARIDE VACCINE, 23-VALENT, ADULT OR IMMUNOSUPPRESSED PT DOSE,   3. Sebaceous cyst  L72.3 706.2 REFERRAL TO DERMATOLOGY   4. Essential hypertension  I10 401.9      Orders Placed This Encounter    US EXAM SCREENING AAA     Standing Status:   Future     Standing Expiration Date:   9/18/2022    PNEUMOCOCCAL POLYSACCHARIDE VACCINE, 23-VALENT, ADULT OR IMMUNOSUPPRESSED PT DOSE,    REFERRAL TO DERMATOLOGY     Referral Priority: Routine     Referral Type:   Consultation     Referral Reason:   Specialty Services Required     Referred to Provider:   David Samano MD     Requested Specialty:   Dermatology     Number of Visits Requested:   1    varicella-zoster recombinant, PF, (Shingrix, PF,) 50 mcg/0.5 mL susr injection     Si.5 mL by IntraMUSCular route once for 1 dose. Dispense:  0.5 mL     Refill:  0    varenicline (CHANTIX) 1 mg tablet     Sig: Take 1 Tablet by mouth two (2) times a day for 60 days. Dispense:  120 Tablet     Refill:  0     stop smoking (advice and handout given), call if any problems  There are no Patient Instructions on file for this visit. Follow-up and Dispositions    · Return in about 2 months (around 10/18/2021) for 30 minutes, Medicare wellness, smoking.

## 2021-11-25 DIAGNOSIS — C61 MALIGNANT NEOPLASM OF PROSTATE (HCC): ICD-10-CM

## 2022-02-14 ENCOUNTER — HOSPITAL ENCOUNTER (EMERGENCY)
Age: 67
Discharge: HOME | End: 2022-02-14
Payer: MEDICARE

## 2022-02-14 VITALS
HEART RATE: 67 BPM | TEMPERATURE: 97.8 F | SYSTOLIC BLOOD PRESSURE: 121 MMHG | BODY MASS INDEX: 20.04 KG/M2 | HEIGHT: 70 IN | DIASTOLIC BLOOD PRESSURE: 61 MMHG | OXYGEN SATURATION: 100 % | WEIGHT: 140 LBS | RESPIRATION RATE: 18 BRPM

## 2022-02-14 DIAGNOSIS — L03.312 CELLULITIS OF BACK EXCEPT BUTTOCK: Primary | ICD-10-CM

## 2022-02-14 PROCEDURE — 99281 EMR DPT VST MAYX REQ PHY/QHP: CPT

## 2022-02-14 RX ORDER — CEPHALEXIN 500 MG/1
500 CAPSULE ORAL 3 TIMES DAILY
Qty: 21 CAPSULE | Refills: 0 | Status: SHIPPED | OUTPATIENT
Start: 2022-02-14 | End: 2022-02-21

## 2022-02-14 RX ORDER — TRAMADOL HYDROCHLORIDE 50 MG/1
50 TABLET ORAL
Qty: 7 TABLET | Refills: 0 | Status: SHIPPED | OUTPATIENT
Start: 2022-02-14 | End: 2022-02-17

## 2022-02-14 NOTE — ED PROVIDER NOTES
EMERGENCY DEPARTMENT HISTORY AND PHYSICAL EXAM      Date: 2/14/2022  Patient Name: Chava Meza    History of Presenting Illness     Chief Complaint   Patient presents with    Cyst       History Provided By: Patient    HPI: Chava Meza, 78 y.o. male with a past medical history significant for hypertension, hypercholesteremia, prostate CA who presents to the ED with cc of cyst on right upper back that has been present for about 4 months that significantly became more painful  over the last few days. Associated symptoms include drainage which patient expressed when he was in the shower this morning. Attempted to make an appointment with the provider to have the area lanced in the past but unable to get an appointment. Patient denies fever, chills, chest pain, shortness of breath, nausea, vomiting, diarrhea, fatigue. There are no other complaints, changes, or physical findings at this time. PCP: Edvin Villegas MD    No current facility-administered medications on file prior to encounter. Current Outpatient Medications on File Prior to Encounter   Medication Sig Dispense Refill    terbinafine HCL (LAMISIL) 250 mg tablet Take 1 Tablet by mouth daily. 90 Tablet 0    ergocalciferol (Vitamin D2) 1,250 mcg (50,000 unit) capsule Take 50,000 Units by mouth every seven (7) days.  simvastatin (ZOCOR) 40 mg tablet Take 40 mg by mouth nightly.  lisinopriL (PRINIVIL, ZESTRIL) 2.5 mg tablet Take 2.5 mg by mouth daily.  multivitamin (ONE A DAY) tablet Take 1 Tab by mouth daily.          Past History     Past Medical History:  Past Medical History:   Diagnosis Date    Hypercholesteremia     Hypertension     Insomnia     Malignant neoplasm of prostate ([facility] [address] 7/29/2020    Microscopic hematuria 7/29/2020       Past Surgical History:  Past Surgical History:   Procedure Laterality Date    HX PROSTATE SURGERY  01/11/2021    Limited pelvic lymph node dissection    HX PROSTATECTOMY 01/11/2021    Robotic prostatectomy    HX UROLOGICAL      cystourethroscopy BRP    HX WISDOM TEETH EXTRACTION      when he was little    MO CYSTOURETHROSCOPY      MO CYSTOURETHROSCOPY,URETER CATHETER         Family History:  Family History   Problem Relation Age of Onset    Colon Cancer Brother     Cancer Brother     Heart Disease Mother     Hypertension Mother     Cancer Father     Cancer Paternal Grandfather        Social History:  Social History     Tobacco Use    Smoking status: Current Every Day Smoker     Packs/day: 1.00     Years: 20.00     Pack years: 20.00     Start date: 4/29/1975    Smokeless tobacco: Never Used   Vaping Use    Vaping Use: Never used   Substance Use Topics    Alcohol use: Not Currently    Drug use: Never       Allergies: Allergies   Allergen Reactions    Aspirin Other (comments)     \"jittery\"         Review of Systems     Review of Systems   Constitutional: Negative for chills, fatigue and fever. HENT: Negative. Respiratory: Negative for cough, chest tightness, shortness of breath and wheezing. Cardiovascular: Negative for chest pain and palpitations. Gastrointestinal: Negative for abdominal pain, diarrhea, nausea and vomiting. Genitourinary: Negative for frequency and urgency. Musculoskeletal: Negative for back pain, neck pain and neck stiffness. Skin: Negative for rash. +cyst R upper back   Neurological: Negative for dizziness, weakness, light-headedness and headaches. Psychiatric/Behavioral: Negative. All other systems reviewed and are negative. Physical Exam     Physical Exam  Vitals and nursing note reviewed. Constitutional:       General: He is not in acute distress. Appearance: Normal appearance. He is not ill-appearing or toxic-appearing. HENT:      Head: Normocephalic and atraumatic.       Nose: Nose normal.      Mouth/Throat:      Mouth: Mucous membranes are moist.   Eyes:      General: Lids are normal. Conjunctiva/sclera:      Right eye: Right conjunctiva is not injected. Left eye: Left conjunctiva is not injected. Cardiovascular:      Rate and Rhythm: Normal rate and regular rhythm. Heart sounds: No murmur heard. No friction rub. No gallop. Pulmonary:      Effort: Pulmonary effort is normal. No tachypnea or respiratory distress. Breath sounds: Normal breath sounds. No stridor or decreased air movement. Musculoskeletal:         General: No swelling, tenderness, deformity or signs of injury. Normal range of motion. Cervical back: Normal range of motion and neck supple. Right lower leg: No edema. Left lower leg: No edema. Skin:     General: Skin is warm. Capillary Refill: Capillary refill takes less than 2 seconds. Comments: R upper back there is an approximately 3x3cm round area of erythema, induration, and tenderness. No fluctuance. No streaking   Neurological:      General: No focal deficit present. Mental Status: He is alert and oriented to person, place, and time. Mental status is at baseline. Psychiatric:         Mood and Affect: Mood normal.         Behavior: Behavior is cooperative. Thought Content: Thought content normal.         Judgment: Judgment normal.         Lab and Diagnostic Study Results     Labs -  No results found for this or any previous visit (from the past 24 hour(s)). Radiologic Studies -   No orders to display       Medical Decision Making   - I am the first provider for this patient. - I reviewed the vital signs, available nursing notes, past medical history, past surgical history, family history and social history. - Initial assessment performed. The patients presenting problems have been discussed, and they are in agreement with the care plan formulated and outlined with them. I have encouraged them to ask questions as they arise throughout their visit.     Vital Signs-Reviewed the patient's vital signs. Patient Vitals for the past 24 hrs:   Temp Pulse Resp BP SpO2   02/14/22 1049 97.8 °F (36.6 °C) 67 18 121/61 100 %       Records Reviewed: Nursing Notes and Old Medical Records    The patient presents with cyst R upper back with a differential diagnosis of abscess, cellulitis, sebaceous cyst      ED Course:          Orders Placed This Encounter    cephALEXin (Keflex) 500 mg capsule     Sig: Take 1 Capsule by mouth three (3) times daily for 7 days. Dispense:  21 Capsule     Refill:  0    traMADoL (Ultram) 50 mg tablet     Sig: Take 1 Tablet by mouth every six (6) hours as needed for Pain for up to 3 days. Max Daily Amount: 200 mg. Dispense:  7 Tablet     Refill:  0       Provider Notes (Medical Decision Making):     MDM  Number of Diagnoses or Management Options  Cellulitis of back except buttock  Diagnosis management comments:     79-year-old male with cyst on right upper back. Has had this for several years. On examination, there is an erythematous area on right upper back without fluctuance. Not amenable to I&D. Could be a chronic sebaceous cyst that has recently become infected. Will treat with Keflex. Refer to dermatology as an outpatient. Patient afebrile, nontoxic-appearing, no streaking from the cyst.  Do not think further work-up needed at this time. Return precautions discussed       Amount and/or Complexity of Data Reviewed  Review and summarize past medical records: yes    Patient Progress  Patient progress: stable           Disposition   Disposition: DC- Adult Discharges: All of the diagnostic tests were reviewed and questions answered. Diagnosis, care plan and treatment options were discussed. The patient understands the instructions and will follow up as directed. The patients results have been reviewed with them. They have been counseled regarding their diagnosis.   The patient verbally convey understanding and agreement of the signs, symptoms, diagnosis, treatment and prognosis and additionally agrees to follow up as recommended with their PCP in 24 - 48 hours. They also agree with the care-plan and convey that all of their questions have been answered. I have also put together some discharge instructions for them that include: 1) educational information regarding their diagnosis, 2) how to care for their diagnosis at home, as well a 3) list of reasons why they would want to return to the ED prior to their follow-up appointment, should their condition change. Discharged    DISCHARGE PLAN:  1. Current Discharge Medication List      CONTINUE these medications which have NOT CHANGED    Details   terbinafine HCL (LAMISIL) 250 mg tablet Take 1 Tablet by mouth daily. Qty: 90 Tablet, Refills: 0    Associated Diagnoses: Onychomycosis      ergocalciferol (Vitamin D2) 1,250 mcg (50,000 unit) capsule Take 50,000 Units by mouth every seven (7) days. simvastatin (ZOCOR) 40 mg tablet Take 40 mg by mouth nightly. lisinopriL (PRINIVIL, ZESTRIL) 2.5 mg tablet Take 2.5 mg by mouth daily. multivitamin (ONE A DAY) tablet Take 1 Tab by mouth daily. 2.   Follow-up Information     Follow up With Specialties Details Why Adela Kelley  Dermatology  Schedule an appointment as soon as possible for a visit  As needed [address] [place] [address],[address]  [phone]        3. Return to ED if worse   4. Discharge Medication List as of 2/14/2022 12:00 PM      START taking these medications    Details   cephALEXin (Keflex) 500 mg capsule Take 1 Capsule by mouth three (3) times daily for 7 days. , Normal, Disp-21 Capsule, R-0      traMADoL (Ultram) 50 mg tablet Take 1 Tablet by mouth every six (6) hours as needed for Pain for up to 3 days. Max Daily Amount: 200 mg., Normal, Disp-7 Tablet, R-0         CONTINUE these medications which have NOT CHANGED    Details   terbinafine HCL (LAMISIL) 250 mg tablet Take 1 Tablet by mouth daily. , Normal, Disp-90 Tablet, R-0      ergocalciferol (Vitamin D2) 1,250 mcg (50,000 unit) capsule Take 50,000 Units by mouth every seven (7) days. , Historical Med      simvastatin (ZOCOR) 40 mg tablet Take 40 mg by mouth nightly., Historical Med      lisinopriL (PRINIVIL, ZESTRIL) 2.5 mg tablet Take 2.5 mg by mouth daily. , Historical Med      multivitamin (ONE A DAY) tablet Take 1 Tab by mouth daily. , Historical Med               Diagnosis     Clinical Impression:   1. Cellulitis of back except buttock        Attestations:    BAYRON Carrillo    Please note that this dictation was completed with [place], the [employer] voice recognition software. Quite often unanticipated grammatical, syntax, homophones, and other interpretive errors are inadvertently transcribed by the computer software. Please disregard these errors. Please excuse any errors that have escaped final proofreading. Thank you.

## 2022-02-28 ENCOUNTER — OFFICE VISIT (OUTPATIENT)
Dept: INTERNAL MEDICINE CLINIC | Age: 67
End: 2022-02-28

## 2022-02-28 VITALS
OXYGEN SATURATION: 99 % | SYSTOLIC BLOOD PRESSURE: 125 MMHG | WEIGHT: 142 LBS | DIASTOLIC BLOOD PRESSURE: 68 MMHG | BODY MASS INDEX: 20.33 KG/M2 | RESPIRATION RATE: 12 BRPM | HEART RATE: 84 BPM | HEIGHT: 70 IN

## 2022-02-28 DIAGNOSIS — F17.218 CIGARETTE NICOTINE DEPENDENCE WITH OTHER NICOTINE-INDUCED DISORDER: ICD-10-CM

## 2022-02-28 DIAGNOSIS — L72.3 SEBACEOUS CYST: Primary | ICD-10-CM

## 2022-02-28 PROCEDURE — G8420 CALC BMI NORM PARAMETERS: HCPCS | Performed by: NURSE PRACTITIONER

## 2022-02-28 PROCEDURE — 1101F PT FALLS ASSESS-DOCD LE1/YR: CPT | Performed by: NURSE PRACTITIONER

## 2022-02-28 PROCEDURE — 3017F COLORECTAL CA SCREEN DOC REV: CPT | Performed by: NURSE PRACTITIONER

## 2022-02-28 PROCEDURE — G8536 NO DOC ELDER MAL SCRN: HCPCS | Performed by: NURSE PRACTITIONER

## 2022-02-28 PROCEDURE — 99213 OFFICE O/P EST LOW 20 MIN: CPT | Performed by: NURSE PRACTITIONER

## 2022-02-28 PROCEDURE — G8427 DOCREV CUR MEDS BY ELIG CLIN: HCPCS | Performed by: NURSE PRACTITIONER

## 2022-02-28 PROCEDURE — G8432 DEP SCR NOT DOC, RNG: HCPCS | Performed by: NURSE PRACTITIONER

## 2022-02-28 NOTE — PROGRESS NOTES
Chief Complaint   Patient presents with    Follow-up    Medication Refill     Visit Vitals  /68 (BP 1 Location: Left upper arm, BP Patient Position: Sitting, BP Cuff Size: Adult)   Pulse 84   Resp 12   Ht 5' 9.5\" (1.765 m)   Wt 142 lb (64.4 kg)   SpO2 99%   BMI 20.67 kg/m²     1. Have you been to the ER, urgent care clinic since your last visit? Hospitalized since your last visit? No    2. Have you seen or consulted any other health care providers outside of the [address] [place] [address] since your last visit? Include any pap smears or colon screening.  No

## 2022-02-28 NOTE — PATIENT INSTRUCTIONS
Epidermoid Cyst: Care Instructions  Your Care Instructions  An epidermoid (say \"ii-pzi-IDV-saman\") cyst is a lump just under the skin. These cysts can form when a hair follicle becomes blocked. They are common in acne and may occur on the face, neck, back, and genitals. However, they can form anywhere on the body. These cysts are not cancer and do not lead to cancer. They tend not to hurt, but they can sometimes become swollen and painful. They also may break open (rupture) and cause scarring. These cysts sometimes do not cause problems and may not need treatment. If you have a cyst that is swollen and hurts, your doctor may inject it with a medicine to help it heal. But it is more likely that a painful cyst will need to be removed. Your doctor will give you a shot of numbing medicine and cut into the cyst to drain it or remove it. This makes the symptoms go away. Follow-up care is a key part of your treatment and safety. Be sure to make and go to all appointments, and call your doctor if you are having problems. It's also a good idea to know your test results and keep a list of the medicines you take. How can you care for yourself at home? · Do not squeeze the cyst or poke it with a needle to open it. This can cause swelling, redness, and infection. · Always have a doctor look at any new lumps you get to make sure that they are not serious. When should you call for help? Watch closely for changes in your health, and be sure to contact your doctor if:    · You have a fever, redness, or swelling after you get a shot of medicine in the cyst.     · You see or feel a new lump on your skin. Where can you learn more? Go to http://www.gray.com/  Enter J802 in the search box to learn more about \"Epidermoid Cyst: Care Instructions. \"  Current as of: March 3, 2021               Content Version: 13.0  © 9045-8565 [employer], [place].    Care instructions adapted under license by [employer] (which disclaims liability or warranty for this information). If you have questions about a medical condition or this instruction, always ask your healthcare professional. [address] any warranty or liability for your use of this information.

## 2022-02-28 NOTE — PROGRESS NOTES
Subjective:      Gwyn Lutz is a 78 y.o. male who I am asked to see in consultation for evaluation of a left soft tissue mass. The patient reports that the mass has been present for 1 year. The onset of the mass was gradual and it is draining. He went to the ED and was given keflex for 7 days. Seems to be improving he notes it is draining and able to squeeze pus/blood from mass. Associated symptoms include itching. Patient denies enlargement, fatigue, weight loss, fevers, night sweats. Review of Systems  Pertinent items are noted in HPI. Objective:     Visit Vitals  /68 (BP 1 Location: Left upper arm, BP Patient Position: Sitting, BP Cuff Size: Adult)   Pulse 84   Resp 12   Ht 5' 9.5\" (1.765 m)   Wt 142 lb (64.4 kg)   SpO2 99%   BMI 20.67 kg/m²     General:   healthy   Neck:   no asymmetry, masses, or scars   Thyroid:   Normal     Lab Results   Component Value Date/Time    WBC 6.8 01/12/2021 05:45 AM    HGB 10.5 (L) 01/12/2021 05:45 AM    HCT 30.9 (L) 01/12/2021 05:45 AM    PLATELET 77 (L) 40/49/1306 05:45 AM    MCV 79.8 (L) 01/12/2021 05:45 AM              Assessment:     soft tissue mass, suspect sebaceous cysts    Plan:     Diagnoses and all orders for this visit:    1. Sebaceous cyst    2. Cigarette nicotine dependence with other nicotine-induced disorder    wound is draining from two small openings, was able to removed 2cc serosanguinous fluid as well as 1cc whitish keratin substance. Able to reduce size significantly. Apply warm compressed and continue to cover and allow to drain. Refer to general surgeon if removal is needed, but pt notes improvement prior to with abx regimen and continued draining, encouraged watchful waiting 2-4 more weeks, printed dx instructions and reviewed red flags.

## 2022-03-18 PROBLEM — C61 MALIGNANT NEOPLASM OF PROSTATE (HCC): Status: ACTIVE | Noted: 2020-07-29

## 2022-03-19 PROBLEM — R32 URINARY INCONTINENCE: Status: ACTIVE | Noted: 2021-01-18

## 2022-03-19 PROBLEM — R55 SYNCOPE AND COLLAPSE: Status: ACTIVE | Noted: 2020-12-15

## 2022-03-19 PROBLEM — Z13.9 ENCOUNTER FOR SCREENING INVOLVING SOCIAL DETERMINANTS OF HEALTH (SDOH): Status: ACTIVE | Noted: 2021-02-23

## 2022-03-19 PROBLEM — N52.9 IMPOTENCE: Status: ACTIVE | Noted: 2021-02-18

## (undated) DEVICE — REM POLYHESIVE ADULT PATIENT RETURN ELECTRODE: Brand: VALLEYLAB

## (undated) DEVICE — CATHETER URETH 18FR BLLN 5CC SIL ALLY W/ SIL HYDRGEL 2 W F

## (undated) DEVICE — DRAPE,UTILITY,TAPE,15X26,STERILE: Brand: MEDLINE

## (undated) DEVICE — SOL IRR SOD CL 0.9% 1000ML BTL --

## (undated) DEVICE — SYRINGE CATH TIP 50ML

## (undated) DEVICE — TOWEL,OR,DSP,ST,BLUE,DLX,6/PK,12PK/CS: Brand: MEDLINE

## (undated) DEVICE — DBD-PACK,LAVH,STERILE: Brand: MEDLINE

## (undated) DEVICE — TISSUE RETRIEVAL SYSTEM: Brand: INZII RETRIEVAL SYSTEM

## (undated) DEVICE — SOL INJ SOD CL 0.9% 1000ML BG --

## (undated) DEVICE — [HIGH FLOW INSUFFLATOR,  DO NOT USE IF PACKAGE IS DAMAGED,  KEEP DRY,  KEEP AWAY FROM SUNLIGHT,  PROTECT FROM HEAT AND RADIOACTIVE SOURCES.]: Brand: PNEUMOSURE

## (undated) DEVICE — LAPAROSCOPIC SCISSORS: Brand: EPIX LAPAROSCOPIC SCISSORS

## (undated) DEVICE — GARMENT CMPR STD UNV CALF FLWT -- RN VENTILATE NS DISP 17- IN

## (undated) DEVICE — PREP SKN CHLRAPRP 26ML TNT -- CONVERT TO ITEM 373320

## (undated) DEVICE — COVER MPLR TIP CRV SCIS ACC DA VINCI

## (undated) DEVICE — ARM DRAPE

## (undated) DEVICE — TROCAR: Brand: KII FIOS FIRST ENTRY

## (undated) DEVICE — CLIP SUT ENDOSCP F/2-0/3-0/4-0 -- LAPRA-TY

## (undated) DEVICE — TRAY URIN CATH PED 16FR BLLN 5CC INDWL STR TIP INF CTRL

## (undated) DEVICE — LAPAROSCOPIC CHOLE PACK: Brand: MEDLINE INDUSTRIES, INC.

## (undated) DEVICE — DERMABOND SKIN ADH 0.7ML -- DERMABOND ADVANCED 12/BX

## (undated) DEVICE — SPONGE LAP SOFT 18X18 IN X RAY DETECTABLE

## (undated) DEVICE — DRAPE,REIN 53X77,STERILE: Brand: MEDLINE

## (undated) DEVICE — DRAPE THER FLUID WARMING 66X44 IN FLAT SLUSH DBL DISC ORS

## (undated) DEVICE — SYR LR LCK 1ML GRAD NSAF 30ML --

## (undated) DEVICE — INSUFFLATION NEEDLE TO ESTABLISH PNEUMOPERITONEUM.: Brand: INSUFFLATION NEEDLE

## (undated) DEVICE — PAD POSITIONING WNG STD KIT W/BODY STRP LF DISP

## (undated) DEVICE — WET SKIN PREP TRAY: Brand: MEDLINE INDUSTRIES, INC.

## (undated) DEVICE — SUT VCRL + 0 27IN CT1 UD --

## (undated) DEVICE — PAD,PREPPING,CUFFED,24X48,7",NONSTERILE: Brand: MEDLINE

## (undated) DEVICE — COLUMN DRAPE

## (undated) DEVICE — BASIC SINGLE BASIN-LF: Brand: MEDLINE INDUSTRIES, INC.

## (undated) DEVICE — SUT MONOCRYL PLUS UD 4-0 --

## (undated) DEVICE — 2, DISPOSABLE SUCTION/IRRIGATOR WITHOUT DISPOSABLE TIP: Brand: STRYKEFLOW

## (undated) DEVICE — SUTURE V-LOC 90 3-0 L6IN ABSRB UD CV-23 L17MM 1/2 CIR VLOCM1904

## (undated) DEVICE — DRAPE,TOP,102X53,STERILE: Brand: MEDLINE

## (undated) DEVICE — SUT PROL 0 30IN CT1 BLU --

## (undated) DEVICE — SUT VCRL + 2-0 27IN SH VIO --

## (undated) DEVICE — GLOVE SURG SZ 7.5 L11.73IN FNGR THK9.8MIL STRW LTX POLYMER

## (undated) DEVICE — SUTURE V-LOC 180 SZ 3-0 L6IN ABSRB VLT CV-23 L17MM 1/2 CIR VLOCM0804

## (undated) DEVICE — TURNOVER KIT OR CUST CMB FLD GEN LF

## (undated) DEVICE — SUTURE PDS II SZ 0 L27IN ABSRB VLT L26MM CT-2 1/2 CIR Z334H

## (undated) DEVICE — PAD,NON-ADHERENT,2X3,STERILE,LF,1/PK: Brand: MEDLINE

## (undated) DEVICE — Device: Brand: JELCO

## (undated) DEVICE — SEAL UNIV 5-8MM DISP BX/10 -- DA VINCI XI - SNGL USE

## (undated) DEVICE — VISUALIZATION SYSTEM: Brand: CLEARIFY

## (undated) DEVICE — INTENDED FOR TISSUE SEPARATION, AND OTHER PROCEDURES THAT REQUIRE A SHARP SURGICAL BLADE TO PUNCTURE OR CUT.: Brand: BARD-PARKER ® CARBON RIB-BACK BLADES

## (undated) DEVICE — BLADELESS OBTURATOR: Brand: WECK VISTA